# Patient Record
Sex: FEMALE | Race: WHITE | NOT HISPANIC OR LATINO | Employment: FULL TIME | URBAN - METROPOLITAN AREA
[De-identification: names, ages, dates, MRNs, and addresses within clinical notes are randomized per-mention and may not be internally consistent; named-entity substitution may affect disease eponyms.]

---

## 2017-03-29 ENCOUNTER — TELEPHONE (OUTPATIENT)
Dept: FAMILY MEDICINE | Facility: OTHER | Age: 27
End: 2017-03-29

## 2017-03-29 NOTE — TELEPHONE ENCOUNTER
Summary:    Patient is due/failing the following:   LDL, PAP and PHQ9    Action needed:   Patient needs office visit for PAP., Patient needs to do PHQ9. and Patient needs fasting lab only appointment    Type of outreach:    Phone, left message for patient to call back.     Questions for provider review:    None                                                                                                                                    Chana Fink       Chart routed to Care Team .        Panel Management Review      Patient has the following on her problem list:     Depression / Dysthymia review  PHQ-9 SCORE 10/29/2014 4/15/2015 6/22/2015   Total Score 14 12 6      Patient is due for:  PHQ9      Composite cancer screening  Chart review shows that this patient is due/due soon for the following Pap Smear

## 2017-03-29 NOTE — LETTER
[facility]  [address]   [place] [address]  Phone: [phone]  April 6, 2017      Audrey Snell  [address]   [place] [address]      Dear Audrey,    We care about your health and have reviewed your health plan including your medical conditions, medications, and lab results.  Based on this review, it is recommended that you follow up regarding the following health topic(s):  -Cholesterol  -Depression  -Cervical Cancer Screening    We recommend you take the following action(s):  -schedule a FOLLOWUP OFFICE APPOINTMENT.  We will perform the following labs:  Lipids (fasting cholesterol - nothing to eat except water and/or meds for 8-10 hours).  -schedule a PAP SMEAR EXAM which is due.  Please disregard this reminder if you have had this exam elsewhere within the last year.  It would be helpful for us to have a copy of your recent pap smear report to update your records.  -Complete and return the attached PHQ-9 Form.  If your total score is greater than 9, please schedule a followup appointment.  If you answer Yes to question 9, call your clinic between the hours of 8 to 5.  You may also call the Suicide Hotline at 8-436-328-YLTE (2738) any time.     Please call us at the [facility] - [phone] (or use jaeyos) to address the above recommendations.     Thank you for trusting [facility] and we appreciate the opportunity to serve you.  We look forward to supporting your healthcare needs in the future.    Healthy Regards,    Your Health Care Team  [facility]

## 2017-10-29 ENCOUNTER — HEALTH MAINTENANCE LETTER (OUTPATIENT)
Age: 27
End: 2017-10-29

## 2018-03-02 ENCOUNTER — TELEPHONE (OUTPATIENT)
Dept: FAMILY MEDICINE | Facility: OTHER | Age: 28
End: 2018-03-02

## 2018-03-02 ENCOUNTER — OFFICE VISIT (OUTPATIENT)
Dept: FAMILY MEDICINE | Facility: CLINIC | Age: 28
End: 2018-03-02

## 2018-03-02 VITALS
HEART RATE: 76 BPM | HEIGHT: 63 IN | DIASTOLIC BLOOD PRESSURE: 70 MMHG | SYSTOLIC BLOOD PRESSURE: 134 MMHG | BODY MASS INDEX: 24.98 KG/M2 | WEIGHT: 141 LBS | RESPIRATION RATE: 18 BRPM | TEMPERATURE: 98.6 F

## 2018-03-02 DIAGNOSIS — R59.0 AXILLARY LYMPHADENOPATHY: Primary | ICD-10-CM

## 2018-03-02 DIAGNOSIS — F17.200 TOBACCO USE DISORDER: ICD-10-CM

## 2018-03-02 DIAGNOSIS — N64.4 BREAST PAIN, LEFT: ICD-10-CM

## 2018-03-02 LAB
BASOPHILS # BLD AUTO: 0 10E9/L (ref 0–0.2)
BASOPHILS NFR BLD AUTO: 0.2 %
DIFFERENTIAL METHOD BLD: NORMAL
EOSINOPHIL # BLD AUTO: 0 10E9/L (ref 0–0.7)
EOSINOPHIL NFR BLD AUTO: 0.4 %
ERYTHROCYTE [DISTWIDTH] IN BLOOD BY AUTOMATED COUNT: 12.3 % (ref 10–15)
HCT VFR BLD AUTO: 38.1 % (ref 35–47)
HGB BLD-MCNC: 13.2 G/DL (ref 11.7–15.7)
LYMPHOCYTES # BLD AUTO: 1.9 10E9/L (ref 0.8–5.3)
LYMPHOCYTES NFR BLD AUTO: 38.5 %
MCH RBC QN AUTO: 30.8 PG (ref 26.5–33)
MCHC RBC AUTO-ENTMCNC: 34.6 G/DL (ref 31.5–36.5)
MCV RBC AUTO: 89 FL (ref 78–100)
MONOCYTES # BLD AUTO: 0.4 10E9/L (ref 0–1.3)
MONOCYTES NFR BLD AUTO: 9.1 %
NEUTROPHILS # BLD AUTO: 2.5 10E9/L (ref 1.6–8.3)
NEUTROPHILS NFR BLD AUTO: 51.8 %
PLATELET # BLD AUTO: 211 10E9/L (ref 150–450)
RBC # BLD AUTO: 4.28 10E12/L (ref 3.8–5.2)
WBC # BLD AUTO: 4.8 10E9/L (ref 4–11)

## 2018-03-02 PROCEDURE — 36415 COLL VENOUS BLD VENIPUNCTURE: CPT | Performed by: PHYSICIAN ASSISTANT

## 2018-03-02 PROCEDURE — 99214 OFFICE O/P EST MOD 30 MIN: CPT | Performed by: PHYSICIAN ASSISTANT

## 2018-03-02 PROCEDURE — 85025 COMPLETE CBC W/AUTO DIFF WBC: CPT | Performed by: PHYSICIAN ASSISTANT

## 2018-03-02 ASSESSMENT — ANXIETY QUESTIONNAIRES
4. TROUBLE RELAXING: NOT AT ALL
7. FEELING AFRAID AS IF SOMETHING AWFUL MIGHT HAPPEN: NOT AT ALL
GAD7 TOTAL SCORE: 6
3. WORRYING TOO MUCH ABOUT DIFFERENT THINGS: SEVERAL DAYS
5. BEING SO RESTLESS THAT IT IS HARD TO SIT STILL: NOT AT ALL
7. FEELING AFRAID AS IF SOMETHING AWFUL MIGHT HAPPEN: NOT AT ALL
6. BECOMING EASILY ANNOYED OR IRRITABLE: SEVERAL DAYS
2. NOT BEING ABLE TO STOP OR CONTROL WORRYING: NEARLY EVERY DAY
1. FEELING NERVOUS, ANXIOUS, OR ON EDGE: SEVERAL DAYS

## 2018-03-02 ASSESSMENT — PATIENT HEALTH QUESTIONNAIRE - PHQ9
10. IF YOU CHECKED OFF ANY PROBLEMS, HOW DIFFICULT HAVE THESE PROBLEMS MADE IT FOR YOU TO DO YOUR WORK, TAKE CARE OF THINGS AT HOME, OR GET ALONG WITH OTHER PEOPLE: NOT DIFFICULT AT ALL
SUM OF ALL RESPONSES TO PHQ QUESTIONS 1-9: 4
SUM OF ALL RESPONSES TO PHQ QUESTIONS 1-9: 4

## 2018-03-02 ASSESSMENT — PAIN SCALES - GENERAL: PAINLEVEL: EXTREME PAIN (8)

## 2018-03-02 NOTE — MR AVS SNAPSHOT
After Visit Summary   3/2/2018    Audrey Snell    MRN: [id number]           Patient Information     Date Of Birth          1990        Visit Information        Provider Department      3/2/2018 2:40 PM Jovana Hodges PA-C [facility]        Today's Diagnoses     Axillary lymphadenopathy    -  1    Breast pain, left        Tobacco use disorder          Care Instructions    No evidence of infection or obvious abscess.  Your white count is normal and low (goes up with infection)    Plan for imaging at [facility]  OTC pain medications - tylenol, ibuprofen  Can take ibuprofen 800mg every 8 hrs with food  Ice pack/cool packs  If pain intolerable to the ER over the weekend          Follow-ups after your visit        Your next 10 appointments already scheduled     Mar 05, 2018 11:10 AM CST   US BREAST LEFT LIMITED 1-3 QUAD with [id number], MG [facility] ([facility])    [address] [place] [address] [phone]           Please bring a list of your medicines (including vitamins, minerals and over-the-counter drugs). Also, tell your doctor about any allergies you may have. Wear comfortable clothes and leave your valuables at home.  You do not need to do anything special to prepare for your exam.  Please call the Imaging Department at your exam site with any questions.            Mar 05, 2018 11:40 AM CST   (Arrive by 11:25 AM)   MA DIAGNOSTIC BILATERAL W/ WILLIAMS with MGMA2, MG [facility] ([facility])    [address] [place] [address] [phone]           Do not use any powder, lotion or deodorant under your arms or on your breast. If you do, we will ask you to remove it before your exam.  Wear comfortable, two-piece clothing.  If you have any allergies, tell your care team.  Bring any previous mammograms from other facilities or have them mailed to  "the breast center.  Three-dimensional (3D) mammograms are available at [facility] locations in [place], [place], [place], [place], [facility], [place], [place], and [place]. -Health locations include [facility] and [facility] in [place]. Benefits of 3D mammograms include: - Improved rate of cancer detection - Decreases your chance of having to go back for more tests, which means fewer: - \"False-positive\" results (This means that there is an abnormal area but it isn't cancer.) - Invasive testing procedures, such as a biopsy or surgery - Can provide clearer images of the breast if you have dense breast tissue. 3D mammography is an optional exam that anyone can have with a 2D mammogram. It doesn't replace or take the place of a 2D mammogram. 2D mammograms remain an effective screening test for all women.  Not all insurance companies cover the cost of a 3D mammogram. Check with your insurance.              Future tests that were ordered for you today     Open Future Orders        Priority Expected Expires Ordered    US Axillary Left STAT  3/2/2019 3/2/2018    US Breast Left Limited 1-3 Quadrants Routine  3/2/2019 3/2/2018    MA Diagnostic Digital Left Routine  3/2/2019 3/2/2018            Who to contact     If you have questions or need follow up information about today's clinic visit or your schedule please contact [facility] directly at [phone].  Normal or non-critical lab and imaging results will be communicated to you by MyChart, letter or phone within 4 business days after the clinic has received the results. If you do not hear from us within 7 days, please contact the clinic through MyChart or phone. If you have a critical or abnormal lab result, we will notify you by phone as soon as possible.  Submit refill requests through [employer] or call your pharmacy and they will forward the refill request to us. Please allow 3 business days for your refill to be completed.       " "   Additional Information About Your Visit        MyChart Information     [employer] gives you secure access to your electronic health record. If you see a primary care provider, you can also send messages to your care team and make appointments. If you have questions, please call your primary care clinic.  If you do not have a primary care provider, please call [phone] and they will assist you.        Care EveryWhere ID     This is your Care EveryWhere ID. This could be used by other organizations to access your [place] medical records  [id number]        Your Vitals Were     Pulse Temperature Respirations Height Last Period BMI (Body Mass Index)    76 98.6  F (37  C) (Temporal) 18 5' 2.6\" (1.59 m) 02/05/2018 25.3 kg/m2       Blood Pressure from Last 3 Encounters:   03/02/18 134/70   03/03/16 120/84   06/22/15 122/84    Weight from Last 3 Encounters:   03/02/18 141 lb (64 kg)   05/19/16 142 lb (64.4 kg)   03/03/16 144 lb (65.3 kg)              We Performed the Following     CBC with platelets and differential        Primary Care Provider Office Phone # Fax #    Maame Hudson [phone]       [address] [address]        Equal Access to Services     ALE MALDONADO : Hadii alysa ku hadasho Soomaali, waaxda luqadaha, qaybta kaalmada adeegyada, ignacia ocasio . So [facility] [phone].    ATENCIÓN: Si habla español, tiene a lr disposición servicios gratuitos de asistencia lingüística. Llame al [phone].    We comply with applicable federal civil rights laws and [place] laws. We do not discriminate on the basis of race, color, national origin, age, disability, sex, sexual orientation, or gender identity.            Thank you!     Thank you for choosing [facility]  for your care. Our goal is always to provide you with excellent care. Hearing back from our patients is one way we can continue to improve our services. Please take a few minutes to " complete the written survey that you may receive in the mail after your visit with us. Thank you!             Your Updated Medication List - Protect others around you: Learn how to safely use, store and throw away your medicines at www.disposemymeds.org.          This list is accurate as of 3/2/18  3:53 PM.  Always use your most recent med list.                   Brand Name Dispense Instructions for use Diagnosis    MOVE FREE PO      Take 1 tablet by mouth        MULTIVITAMIN PO           SUMAtriptan 25 MG tablet    IMITREX    9 tablet    TAKE 1 TO 2 TABLETS BY MOUTH AT ONSET OF HEADACHE FOR MIGRAINE. MAY REPEAT DOSE IN 2 HOURS. DO NOT EXCEED 200MG IN 24 HOURS    Migraine without status migrainosus, not intractable, unspecified migraine type       ZYRTEC ALLERGY PO

## 2018-03-02 NOTE — PROGRESS NOTES
SUBJECTIVE:   Audrey Snell is a 27 year old female who presents to clinic today for the following health issues:      HPI     Pain in left axilla.     Onset: 1 month ago, patient thought she pulled muscle from shoveling the snow   Sore in back and shoulders. That soreness went away from shoveling. Then a few days after, was applying deodorant felt pain in left axillary area  Found little lump where if pushes on it, it is sore. No drainage or red skin.   Left breast is sore. Has not felt for breast lumps in tissue. No nipple discharge.  Tingling into fingers-  Last night and today. Feels weak with gripping.   Tried heat pack and aggravated it more.   Past few days more uncomfortable.   No fevers, chills. No malaise or flu like sx.   Weight stable.    No other enlarged nodes    Had something similar in 2012- had pain in right axilla.   Had US showing enlarged lymph node.   Node was not going down in size, so had surgery to remove the node.     No fam hx of breast cancer.    Denies neck pain, radiculopathy features.       Description:   Location: left armpit radiated down the fingers   Character: tingling, numbness, dulled ache, burn     Intensity: severe    Progression of Symptoms: worse    Accompanying Signs & Symptoms:  Other symptoms: radiation of pain to fingers, numbness, tingling and warmth    History:   Previous similar pain: YES- lymph node removed in right armpit about 5 years ago. It feels the same sensation.       Precipitating factors:   Trauma or overuse: no, but possible from shoveling the snow     Alleviating factors:  Improved by: nothing    Therapies Tried and outcome: ibuprofen, advil, heating pad      Problem list and histories reviewed & adjusted, as indicated.  Additional history: as documented      Patient Active Problem List   Diagnosis     CARDIOVASCULAR SCREENING; LDL GOAL LESS THAN 160     Anxiety     Moderate major depression (H)     Lumbar radiculopathy     Pain in joint, lower leg      Tobacco use disorder     Migraine     Irritable bowel syndrome with diarrhea     Iliotibial band syndrome, left knee     Past Surgical History:   Procedure Laterality Date     BIOPSY LYMPH NODE AXILLA  3/22/2012    Procedure:BIOPSY LYMPH NODE AXILLA; BIOPSY LYMPH NODE AXILLA ,RIGHT; Surgeon:MELANI DUNAWAY; Location:[facility] OR     none         Social History   Substance Use Topics     Smoking status: Current Every Day Smoker     Packs/day: 1.00     Years: 3.50     Types: Cigarettes     Smokeless tobacco: Never Used      Comment: 1 ppd     Alcohol use No     Family History   Problem Relation Age of Onset     Hypertension Mother      Hypertension Father      CANCER Maternal Grandfather      Asthma Sister      Family History Negative No family hx of      C.A.D. No family hx of      DIABETES No family hx of      CEREBROVASCULAR DISEASE No family hx of      Breast Cancer No family hx of      Prostate Cancer No family hx of      Alcohol/Drug No family hx of      Allergies No family hx of      Alzheimer Disease No family hx of      Anesthesia Reaction No family hx of      Arthritis No family hx of      Blood Disease No family hx of      Cardiovascular No family hx of      Circulatory No family hx of      Congenital Anomalies No family hx of      Connective Tissue Disorder No family hx of      Depression No family hx of      Endocrine Disease No family hx of      Eye Disorder No family hx of      Genetic Disorder No family hx of      GASTROINTESTINAL DISEASE No family hx of      Genitourinary Problems No family hx of      Gynecology No family hx of      HEART DISEASE No family hx of      Lipids No family hx of      Musculoskeletal Disorder No family hx of      Neurologic Disorder No family hx of      Obesity No family hx of      OSTEOPOROSIS No family hx of      Psychotic Disorder No family hx of      Respiratory No family hx of      Thyroid Disease No family hx of      Hearing Loss No family hx of          Current  "Outpatient Prescriptions   Medication Sig Dispense Refill     Cetirizine HCl (ZYRTEC ALLERGY PO)        Glucosamine-Chondroitin (MOVE FREE PO) Take 1 tablet by mouth       SUMAtriptan (IMITREX) 25 MG tablet TAKE 1 TO 2 TABLETS BY MOUTH AT ONSET OF HEADACHE FOR MIGRAINE. MAY REPEAT DOSE IN 2 HOURS. DO NOT EXCEED 200MG IN 24 HOURS (Patient not taking: Reported on 3/2/2018) 9 tablet 0     Multiple Vitamins-Minerals (MULTIVITAMIN PO)        No Known Allergies  BP Readings from Last 3 Encounters:   03/02/18 134/70   03/03/16 120/84   06/22/15 122/84    Wt Readings from Last 3 Encounters:   03/02/18 141 lb (64 kg)   05/19/16 142 lb (64.4 kg)   03/03/16 144 lb (65.3 kg)                  Labs reviewed in EPIC    ROS:  Constitutional, HEENT, cardiovascular, pulmonary, gi and gu systems are negative, except as otherwise noted.    OBJECTIVE:     /70  Pulse 76  Temp 98.6  F (37  C) (Temporal)  Resp 18  Ht 5' 2.6\" (1.59 m)  Wt 141 lb (64 kg)  LMP 02/05/2018  BMI 25.3 kg/m2  Body mass index is 25.3 kg/(m^2).  GENERAL: healthy, alert and no distress  EYES: Eyes grossly normal to inspection, PERRL and conjunctivae and sclerae normal  NECK: no adenopathy, no asymmetry, masses  RESP: lungs clear to auscultation - no rales, rhonchi or wheezes  BREAST: RIGHT: normal without masses, tenderness or nipple discharge, well healed surgical scar in right axilla. LEFT: focal tenderness 3:00 position, no nipple discharge. Noted  + palpable tender enlarged left axillary adenopathy. No focal mass/abscess. No overlying skin changes, redness, or inflammation of hair follicles.    CV: regular rate and rhythm, normal S1 S2, no S3 or S4, no murmur, click or rub  ABDOMEN: soft, nontender, no hepatosplenomegaly, no masses and bowel sounds normal  MS: Cspine: normal ROM without pain, or radiculopathy features.  5/5. Mild hypoesthesia of palmar 5th finger left hand vs right.   PSYCH: mentation appears normal, affect tearful, tearful with " exam of left breast and left axilla  LYMPH: no cervical, supraclavicular, or inguinal adenopathy    Diagnostic Test Results:  Results for orders placed or performed in visit on 03/02/18 (from the past 24 hour(s))   CBC with platelets and differential   Result Value Ref Range    WBC 4.8 4.0 - 11.0 10e9/L    RBC Count 4.28 3.8 - 5.2 10e12/L    Hemoglobin 13.2 11.7 - 15.7 g/dL    Hematocrit 38.1 35.0 - 47.0 %    MCV 89 78 - 100 fl    MCH 30.8 26.5 - 33.0 pg    MCHC 34.6 31.5 - 36.5 g/dL    RDW 12.3 10.0 - 15.0 %    Platelet Count 211 150 - 450 10e9/L    Diff Method Automated Method     % Neutrophils 51.8 %    % Lymphocytes 38.5 %    % Monocytes 9.1 %    % Eosinophils 0.4 %    % Basophils 0.2 %    Absolute Neutrophil 2.5 1.6 - 8.3 10e9/L    Absolute Lymphocytes 1.9 0.8 - 5.3 10e9/L    Absolute Monocytes 0.4 0.0 - 1.3 10e9/L    Absolute Eosinophils 0.0 0.0 - 0.7 10e9/L    Absolute Basophils 0.0 0.0 - 0.2 10e9/L       ASSESSMENT/PLAN:     1. Axillary lymphadenopathy  Normal CBC  Plan for imaging of left axilla and breast  Normal vitals. Afebrile. No evidence on exam of abscess, cellulitis or infection.   No cspine radiculopathy features  - US Axillary Left; Future  - CBC with platelets and differential    2. Breast pain, left  Focal tenderness 3:00 position  - US Breast Left Limited 1-3 Quadrants; Future  - MA Diagnostic Digital Left; Future    3. Tobacco use disorder  Encouraged cessation      Follow Up: imaging scheduled for Monday. No available imaging Friday at 3:15pm.   For worsening symptoms (ie new fevers, worsening pain, redness, sx of abscess, weakness in hand) to ER over weekend.   Discussed parameters for follow up and included in After Visit Summary given to patient.      Jovana Hodges PA-C  [facility]

## 2018-03-02 NOTE — PATIENT INSTRUCTIONS
No evidence of infection or obvious abscess.  Your white count is normal and low (goes up with infection)    Plan for imaging at [facility]  OTC pain medications - tylenol, ibuprofen  Can take ibuprofen 800mg every 8 hrs with food  Ice pack/cool packs  If pain intolerable to the ER over the weekend

## 2018-03-02 NOTE — TELEPHONE ENCOUNTER
"Patient on AE schedule Monday for \"left armpit lump - some numbness like it's hitting a nerve.\"    Please triage if appropriate.  Meseret Sargent, [address]    "

## 2018-03-02 NOTE — TELEPHONE ENCOUNTER
Next 5 appointments (look out 90 days)     Mar 05, 2018  3:15 PM CST   Office Visit with Maame Hudson MD   [facility]    [address]  [place] [address]   [phone]              Audrey Snell is a 27 year old female who calls with lump in the left arm pit.    NURSING ASSESSMENT:  Description:  Lump in left arm pit very uncomfortable. Causing tingling down into the finders. Swelling around the lump. History of inflamed lymph node in the right arm pit that was removed. Taking ibuprofen and aleve as needed. Denies fevers, drainage, discharge, hot to the touch.   Onset/duration:  About 1 month  Precip. factors:  History of swollen lymph nodes in arm pits  Associated symptoms:  Swelling, pain   Improves/worsens symptoms:  Gradually worsening   Pain scale (0-10)   8/10  LMP/preg/breast feeding:  Not addressed   Last exam/Treatment:  03/03/2016  Allergies: No Known Allergies     NURSING PLAN: Nursing advice to patient offered OV in [place] for today due to discomfort, OKAY to keep scheduled appointment    RECOMMENDED DISPOSITION:  See in 24 hours - for pain with lump  Will comply with recommendation: stated would like to keep scheduled appt at this time, if able to get someone to cover her work shift today will call back to see if an opening in [place] is still avilable  If further questions/concerns or if symptoms do not improve, worsen or new symptoms develop, call your PCP or [facility] as soon as possible.    NOTES:  Disposition was determined by the first positive assessment question, therefore all previous assessment questions were negative    Guideline used:  Telephone Triage Protocols for Nurses, Fifth Edition, Audrey Garcia  Skin Lesions: Lumps, Bumps, and Sores  Nursing Judgment    Adelina Mattson, RN, BSN

## 2018-03-03 ASSESSMENT — PATIENT HEALTH QUESTIONNAIRE - PHQ9: SUM OF ALL RESPONSES TO PHQ QUESTIONS 1-9: 4

## 2018-03-03 ASSESSMENT — ANXIETY QUESTIONNAIRES: GAD7 TOTAL SCORE: 6

## 2018-03-05 ENCOUNTER — RADIANT APPOINTMENT (OUTPATIENT)
Dept: MAMMOGRAPHY | Facility: CLINIC | Age: 28
End: 2018-03-05
Attending: PHYSICIAN ASSISTANT

## 2018-03-05 ENCOUNTER — RADIANT APPOINTMENT (OUTPATIENT)
Dept: ULTRASOUND IMAGING | Facility: CLINIC | Age: 28
End: 2018-03-05
Attending: PHYSICIAN ASSISTANT

## 2018-03-05 DIAGNOSIS — N64.4 BREAST PAIN, LEFT: ICD-10-CM

## 2018-03-05 DIAGNOSIS — R59.0 AXILLARY LYMPHADENOPATHY: ICD-10-CM

## 2018-03-05 PROCEDURE — 76882 US LMTD JT/FCL EVL NVASC XTR: CPT | Mod: LT

## 2018-03-05 PROCEDURE — 76642 ULTRASOUND BREAST LIMITED: CPT | Mod: LT

## 2018-03-05 PROCEDURE — 77066 DX MAMMO INCL CAD BI: CPT

## 2018-03-07 ENCOUNTER — RADIANT APPOINTMENT (OUTPATIENT)
Dept: ULTRASOUND IMAGING | Facility: CLINIC | Age: 28
End: 2018-03-07
Attending: PHYSICIAN ASSISTANT

## 2018-03-07 DIAGNOSIS — R59.1 LA (LYMPHADENOPATHY): Primary | ICD-10-CM

## 2018-03-07 DIAGNOSIS — N64.4 BREAST PAIN, LEFT: ICD-10-CM

## 2018-03-07 PROCEDURE — 87116 MYCOBACTERIA CULTURE: CPT | Mod: 90 | Performed by: PHYSICIAN ASSISTANT

## 2018-03-07 PROCEDURE — 38505 NEEDLE BIOPSY LYMPH NODES: CPT | Performed by: RADIOLOGY

## 2018-03-07 PROCEDURE — 88185 FLOWCYTOMETRY/TC ADD-ON: CPT | Mod: 59 | Performed by: PHYSICIAN ASSISTANT

## 2018-03-07 PROCEDURE — 88185 FLOWCYTOMETRY/TC ADD-ON: CPT | Performed by: PHYSICIAN ASSISTANT

## 2018-03-07 PROCEDURE — 99000 SPECIMEN HANDLING OFFICE-LAB: CPT | Performed by: PHYSICIAN ASSISTANT

## 2018-03-07 PROCEDURE — 88184 FLOWCYTOMETRY/ TC 1 MARKER: CPT | Performed by: PHYSICIAN ASSISTANT

## 2018-03-07 PROCEDURE — 88341 IMHCHEM/IMCYTCHM EA ADD ANTB: CPT | Performed by: PHYSICIAN ASSISTANT

## 2018-03-07 PROCEDURE — 76942 ECHO GUIDE FOR BIOPSY: CPT | Performed by: RADIOLOGY

## 2018-03-07 PROCEDURE — 88342 IMHCHEM/IMCYTCHM 1ST ANTB: CPT | Performed by: PHYSICIAN ASSISTANT

## 2018-03-07 PROCEDURE — 88305 TISSUE EXAM BY PATHOLOGIST: CPT | Performed by: PHYSICIAN ASSISTANT

## 2018-03-07 PROCEDURE — 88312 SPECIAL STAINS GROUP 1: CPT | Performed by: PHYSICIAN ASSISTANT

## 2018-03-08 LAB — COPATH REPORT: NORMAL

## 2018-03-12 ENCOUNTER — TELEPHONE (OUTPATIENT)
Dept: GENERAL RADIOLOGY | Facility: CLINIC | Age: 28
End: 2018-03-12

## 2018-03-12 NOTE — TELEPHONE ENCOUNTER
Spoke to Audrey regarding the pending pathology results from her breast biopsy. Writer told Audrey that the results have not been finalized yet and that we will connect with the Pathology department tomorrow to check on the status of the results if necessary. Audrey expressed appreciation for the call and verbalized understanding.

## 2018-03-21 ENCOUNTER — TELEPHONE (OUTPATIENT)
Dept: FAMILY MEDICINE | Facility: OTHER | Age: 28
End: 2018-03-21

## 2018-03-21 DIAGNOSIS — I88.1 KIKUCHI DISEASE: Primary | ICD-10-CM

## 2018-03-21 DIAGNOSIS — R59.1 LYMPHADENOPATHY: ICD-10-CM

## 2018-03-21 NOTE — TELEPHONE ENCOUNTER
Messages exchanged with  in [place]. She advised consult and likely excisional bx if sx persist. Spoke with Audrey who would like to proceed with surgical consult and is ok with going to [place] to see . Please assist pt with scheduling with  for office visit/consult. Please call Audrey at [phone] ([employer]). Jovana Hodges PA-C

## 2018-03-28 ENCOUNTER — OFFICE VISIT (OUTPATIENT)
Dept: SURGERY | Facility: OTHER | Age: 28
End: 2018-03-28
Attending: PHYSICIAN ASSISTANT

## 2018-03-28 VITALS
SYSTOLIC BLOOD PRESSURE: 140 MMHG | OXYGEN SATURATION: 100 % | BODY MASS INDEX: 26.02 KG/M2 | TEMPERATURE: 98 F | WEIGHT: 145 LBS | DIASTOLIC BLOOD PRESSURE: 80 MMHG | HEART RATE: 92 BPM

## 2018-03-28 DIAGNOSIS — R59.0 LYMPHADENOPATHY, AXILLARY: Primary | ICD-10-CM

## 2018-03-28 PROCEDURE — 99243 OFF/OP CNSLTJ NEW/EST LOW 30: CPT | Performed by: SURGERY

## 2018-03-28 ASSESSMENT — PAIN SCALES - GENERAL: PAINLEVEL: SEVERE PAIN (6)

## 2018-03-28 NOTE — PROGRESS NOTES
General Surgery Consultation    Audrey Snell MRN# [id number]   Age: 27 year old YOB: 1990     Reason for consult: lymphadenitis                        Assessment and Plan:   I was asked to see this patient at the request of AGUILA Armando for evaluation of lymphadenitis.  Audrey Snell is a 27 year old female who presented with history, exam, laboratory and imaging most consistent with:        ICD-10-CM    1. Lymphadenopathy, axillary, left R59.0        Patient has lymphadenopathy on the left axilla.  Stated the symptoms is improved and the swelling in her axilla is getting better.  She previously had this core biopsy and final diagnosis as necrotizing lymphadenopathy favoring Kikuchi lymphadenitis; however per final pathology report due to the tiny core biopsy sample, the study is limited.  Thus I recommend that she sees me to her primary care provider to possibly do an excisional biopsy of the lymph node.  Since everything is slowly improving, I recommend that patient continue without an excisional biopsy for 2-4 weeks, if the lymphadenopathy and the pain does not continue to improve in this time or patient becomes more anxious due to the diagnosis we can always schedule an excisional biopsy of the lymph node.  I explained to the patient that this procedure is not a curative procedure, it is simply to get more tissue to confirm the diagnosis.  I explained to the patient that the procedure would require a wire localization into this lymph node which has a clip, and I would take out that one particular lymph node.  Explained to the patient the risk of infections, bleeding, and more surgery, patient symptoms might not improve after surgery or it can get worse due to surgery.  Patient shows understanding and agrees with the plan above.  She will call me in 2-4 weeks if she wants to get the procedure done.      I thank AGUILA Armando for the opportunity to participate in the patient's care.            Chief Complaint:     Left axillary lymphadenopathy     History is obtained from the patient         History of Present Illness:   This patient is a 27 year old  female with a significant past medical history of Kikuchi lymphadenitis of the right axilla who presents with 2+ mos history of left axillary pain and lymphadenopathy.  Patient stated that she was shoveling 2+ months ago and started feeling pain in her left axilla, thought she pulled a muscle however the pain got worse and also her axilla increased in size.  She just cannot let this go but finally went and saw her primary care provider as the pain did not improve and she had associated symptoms like daily night sweats swelling in her axilla, nausea, and vomiting.  Patient also started noticing left breast pain approximately 1 month ago.  Stated that this breast pain is slowly getting worse.  Patient stated her other symptoms of this axillary pain and breast pain is pain that radiates into her arm all the way down to all of her fingers she has numbness and tingling.  Patient denies any fevers however she does have chills.  She denies any weight loss.  Patient has history of similar presentation on the right axilla in 2012.  She had an excisional biopsy of her right axilla lymph node pathology came back as Kikuchi lymphadenitis.  She stated at that time her pain and all of her associated symptoms of the right axilla improved after she had the excisional biopsy.  Patient does have family history of lymphoma in her maternal grandfather.  Patient stated overall her pain in the left axilla has improved.  Swelling has also improved.  However patient stated the pain increase in the swelling increase especially at night after she had a full day of work.          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          Past  Surgical History:     Past Surgical History:   Procedure Laterality Date     BIOPSY LYMPH NODE AXILLA  3/22/2012    Procedure:BIOPSY LYMPH NODE AXILLA; BIOPSY LYMPH NODE AXILLA ,RIGHT; Surgeon:MELANI DUNAWAY; Location: OR     none             Medications:     Current Outpatient Prescriptions on File Prior to Visit:  Cetirizine HCl (ZYRTEC ALLERGY PO)    Multiple Vitamins-Minerals (MULTIVITAMIN PO)    Glucosamine-Chondroitin (MOVE FREE PO) Take 1 tablet by mouth   SUMAtriptan (IMITREX) 25 MG tablet TAKE 1 TO 2 TABLETS BY MOUTH AT ONSET OF HEADACHE FOR MIGRAINE. MAY REPEAT DOSE IN 2 HOURS. DO NOT EXCEED 200MG IN 24 HOURS (Patient not taking: Reported on 3/2/2018)     No current facility-administered medications on file prior to visit.       Allergies:    No Known Allergies         Social History:   Audrey Snell  reports that she has been smoking Cigarettes.  She has a 3.50 pack-year smoking history. She has never used smokeless tobacco. She reports that she uses illicit drugs. She reports that she does not drink alcohol.          Family History:   The patient has no family history of any bleeding, clotting or anesthesia problems.          Review of Systems:     Skin: negative, rash, scaling, itching, bruising  Eyes: negative for, glaucoma, eye pain, color blindness  Ears/Nose/Throat: negative, deafness, tinnitus  Respiratory: No shortness of breath, dyspnea on exertion, cough, or hemoptysis  Cardiovascular: negative, palpitations, tachycardia and irregular heart beat  Gastrointestinal: positive for nausea, vomiting and reflux  Genitourinary: negative, dysuria, frequency and urgency  Musculoskeletal: positive for left axilla pain,  Left arm and fingers numbness/tingling  Neurologic: positive for numbness or tingling of hands  Psychiatric: positive for anxiety  Hematologic/Lymphatic/Immunologic: positive for swollen nodes  Endocrine: negative         Physical Exam:     Constitutional: Awake, alert, no acute  distress.  Eyes:  No scleral icterus.  Conjunctiva are without injection.  ENMT: Mucous membranes moist, dentition and gums are intact.   Neck: Soft, supple, trachea midline.    Endocrine: The thyroid is without masses and mobile with swallow.   Lymphatic: There is no cervical, submandibular, supraclavicular/infraclavicular, axillary, or inguinal adenopathy.  Respiratory: Lungs are clear to auscultation and percussion bilaterally.   Cardiovascular: Regular rate and rhythm. No murmurs, rubs, or gallops.    Abdomen: Non-distended, non-tender, normoactive bowel sounds present, No masses, , or flank tenderness. No hepatosplenomegaly.   Breasts: bilateral normal without suspicious masses, skin changes or axillary nodes, symmetric fibrous changes in both upper outer quadrants, self exam is taught and encouraged.  Bilateral fibrocystic dense glandular breasts.    Axillae: palpable several lymph nodes on left axilla, these were nonmatted but tender to palpation.  Right axilla with no lymphadenopathy   Musculoskeletal: No spinal or CVA tenderness. Full range of motion in the upper and lower extremities.    Skin: No skin rashes or lesions to inspection.  No petechia.    Neurologic: Cranial nerves II through XII are grossly intact and symmetric.  Psychiatric: The patient is alert and oriented times 3.  The patient's affect is not blunted and mood is appropriate.          Data:   WBC -   WBC   Date Value Ref Range Status   03/02/2018 4.8 4.0 - 11.0 10e9/L Final   ], HgB -   Hemoglobin   Date Value Ref Range Status   03/02/2018 13.2 11.7 - 15.7 g/dL Final   ]   Liver Function Studies -   Recent Labs   Lab Test  10/29/14   1359   PROTTOTAL  7.5   ALBUMIN  4.4   BILITOTAL  0.4   ALKPHOS  53   AST  12   ALT  25     Recent Results (from the past 744 hour(s))   MA Diagnostic Digital Bilateral    Narrative    Exam: Bilateral digital diagnostic mammography with CAD, left breast  and left axillary ultrasound. - 3/5/2018 11:57  AM.    History: Screening callback. Left axillary pain, and left breast pain  at 3:00 position Similar symptoms to previous right axillary pain.  Previous excisional biopsy of right axillary lymph node with pathology  of necrotizing lymphadenitis on 3/22/2012.    Comparisons: Right axillary ultrasound dating 2/29/2012    FINDINGS:        BREAST DENSITY: Heterogeneously dense.    Mammography demonstrates no suspicious findings in either breast.     Targeted left breast ultrasound performed by the technologist and  radiologist in the region of focal pain at 3:00 position, 4 cm from  the nipple demonstrating normal breast tissue. Several small  benign-appearing cysts are seen along the 3:00 axis during real-time  imaging.     Evaluation of the left axilla, in the region of focal tenderness,  demonstrates a round circumscribed mass measuring 1.3 x 1.2 x 1.2 cm  with heterogeneous echogenicity and no internal vascularity, likely  represent abnormal and possibly necrotic lymph node. Multiple  additional enlarged left axillary lymph nodes with thickened cortex  and effaced fatty hilum are seen in the left axilla including at level  1 and level 2.     Right axilla evaluated for comparison demonstrating several  benign-appearing lymph nodes during real-time imaging.       Impression    IMPRESSION: BI-RADS CATEGORY: 4 - Suspicious Abnormality-Biopsy Should  Be Considered.    RECOMMENDED FOLLOW-UP: Biopsy.  Ultrasound guided core needle left axillary biopsy.    Clinical follow-up of left breast symptomatic areas. Given lack of  suspicious imaging findings, management would need to be based on  clinical grounds.      The patient was given the results of the examination.    I have personally reviewed the examination and initial interpretation  and I agree with the findings.    NIKA DUFFY MD   US Breast Left Limited 1-3 Quadrants    Narrative    Exam: Bilateral digital diagnostic mammography with CAD, left breast  and  left axillary ultrasound. - 3/5/2018 11:57 AM.    History: Screening callback. Left axillary pain, and left breast pain  at 3:00 position Similar symptoms to previous right axillary pain.  Previous excisional biopsy of right axillary lymph node with pathology  of necrotizing lymphadenitis on 3/22/2012.    Comparisons: Right axillary ultrasound dating 2/29/2012    FINDINGS:        BREAST DENSITY: Heterogeneously dense.    Mammography demonstrates no suspicious findings in either breast.     Targeted left breast ultrasound performed by the technologist and  radiologist in the region of focal pain at 3:00 position, 4 cm from  the nipple demonstrating normal breast tissue. Several small  benign-appearing cysts are seen along the 3:00 axis during real-time  imaging.     Evaluation of the left axilla, in the region of focal tenderness,  demonstrates a round circumscribed mass measuring 1.3 x 1.2 x 1.2 cm  with heterogeneous echogenicity and no internal vascularity, likely  represent abnormal and possibly necrotic lymph node. Multiple  additional enlarged left axillary lymph nodes with thickened cortex  and effaced fatty hilum are seen in the left axilla including at level  1 and level 2.     Right axilla evaluated for comparison demonstrating several  benign-appearing lymph nodes during real-time imaging.       Impression    IMPRESSION: BI-RADS CATEGORY: 4 - Suspicious Abnormality-Biopsy Should  Be Considered.    RECOMMENDED FOLLOW-UP: Biopsy.  Ultrasound guided core needle left axillary biopsy.    Clinical follow-up of left breast symptomatic areas. Given lack of  suspicious imaging findings, management would need to be based on  clinical grounds.      The patient was given the results of the examination.    I have personally reviewed the examination and initial interpretation  and I agree with the findings.    NIKA DUFFY MD   US Axillary Left    Narrative    Exam: Bilateral digital diagnostic mammography with  CAD, left breast  and left axillary ultrasound. - 3/5/2018 11:57 AM.    History: Screening callback. Left axillary pain, and left breast pain  at 3:00 position Similar symptoms to previous right axillary pain.  Previous excisional biopsy of right axillary lymph node with pathology  of necrotizing lymphadenitis on 3/22/2012.    Comparisons: Right axillary ultrasound dating 2/29/2012    FINDINGS:        BREAST DENSITY: Heterogeneously dense.    Mammography demonstrates no suspicious findings in either breast.     Targeted left breast ultrasound performed by the technologist and  radiologist in the region of focal pain at 3:00 position, 4 cm from  the nipple demonstrating normal breast tissue. Several small  benign-appearing cysts are seen along the 3:00 axis during real-time  imaging.     Evaluation of the left axilla, in the region of focal tenderness,  demonstrates a round circumscribed mass measuring 1.3 x 1.2 x 1.2 cm  with heterogeneous echogenicity and no internal vascularity, likely  represent abnormal and possibly necrotic lymph node. Multiple  additional enlarged left axillary lymph nodes with thickened cortex  and effaced fatty hilum are seen in the left axilla including at level  1 and level 2.     Right axilla evaluated for comparison demonstrating several  benign-appearing lymph nodes during real-time imaging.       Impression    IMPRESSION: BI-RADS CATEGORY: 4 - Suspicious Abnormality-Biopsy Should  Be Considered.    RECOMMENDED FOLLOW-UP: Biopsy.  Ultrasound guided core needle left axillary biopsy.    Clinical follow-up of left breast symptomatic areas. Given lack of  suspicious imaging findings, management would need to be based on  clinical grounds.      The patient was given the results of the examination.    I have personally reviewed the examination and initial interpretation  and I agree with the findings.    NIKA DUFFY MD   US Biopsy Lymph Node Core    Addendum: 3/16/2018     "Addendum:    Lymph node, left axillary, ultrasound-guided fine needle aspiration:   - Necrotizing lymphadenopathy, favor Kikuchi lymphadenitis.  Similar  morphological features when compared to 2012 right excisional axillary  lymph node biopsy; with consensus at hematopathology faculty  conference.  - No immunophenotypic evidence of B cell non-Hodgkin lymphoma in this  limited study.     Concordant with imaging.    Recommendation:  Clinical follow-up left axillary lymph nodes and left  breast pain.  Further management would need to be based on clinical  grounds.    ROBERT WHITFIELD MD      Narrative    EXAMINATION: US BIOPSY CORE LYMPH NODE, 3/7/2018 2:28 PM     HISTORY: 27 years old with left axillary and left breast pain.  Previous excisional biopsy of right axillary lymph node of necrotizing  lymphadenitis in 2012.    COMPARISON: Mammogram and ultrasound 3/5/2018.    CONSENT: The procedure, benefits and risks, were explained and  discussed with the patient. Risks included: infection, bleeding, and  the small possibility of even life threatening complications. Written  and verbal consent were obtained.    PROCEDURE: Using aseptic technique, up to 10 cc of 1% lidocaine  buffered with sodium bicarbonate for local anesthesia, ultrasound  guidance, and a biopsy needle were utilized to sample lesion. A  radiopaque biopsy marker was placed. Pressure was held over this area  for approximately 10 to 15 minutes until there was adequate  hemostasis. A dressing was placed and post biopsy care instructions  were discussed with the patient. There were no apparent complications.    Preprocedure imaging demonstrates numerous enlarged lymph nodes. There  is diffuse increased vascularity.    Location: Left axilla  Needle: 14 gauge core needle biopsy system  No. of passes: 3  Clip shape: BiomarC (shaped liked a \"barbell\") clip     A specimen was placed into formalin, RPMI, and saline to test for  histology, cytometry, and atypical " infection, respectively.      Impression    IMPRESSION: Uncomplicated ultrasound-guided core needle biopsy of the  left axilla.    CHAD DELANEY MD        [facility], DO 3/28/2018 8:38 AM

## 2018-03-28 NOTE — LETTER
3/28/2018         RE: Audrey Snell  [address]  [facility] [address]        Dear Colleague,    Thank you for referring your patient, Audrey Snell, to the [facility]. Please see a copy of my visit note below.    General Surgery Consultation    Audrey Snell MRN# [id number]   Age: 27 year old YOB: 1990     Reason for consult: lymphadenitis                        Assessment and Plan:   I was asked to see this patient at the request of AGUILA Armando for evaluation of lymphadenitis.  Audrey Snell is a 27 year old female who presented with history, exam, laboratory and imaging most consistent with:        ICD-10-CM    1. Lymphadenopathy, axillary, left R59.0        Patient has lymphadenopathy on the left axilla.  Stated the symptoms is improved and the swelling in her axilla is getting better.  She previously had this core biopsy and final diagnosis as necrotizing lymphadenopathy favoring Kikuchi lymphadenitis; however per final pathology report due to the tiny core biopsy sample, the study is limited.  Thus I recommend that she sees me to her primary care provider to possibly do an excisional biopsy of the lymph node.  Since everything is slowly improving, I recommend that patient continue without an excisional biopsy for 2-4 weeks, if the lymphadenopathy and the pain does not continue to improve in this time or patient becomes more anxious due to the diagnosis we can always schedule an excisional biopsy of the lymph node.  I explained to the patient that this procedure is not a curative procedure, it is simply to get more tissue to confirm the diagnosis.  I explained to the patient that the procedure would require a wire localization into this lymph node which has a clip, and I would take out that one particular lymph node.  Explained to the patient the risk of infections, bleeding, and more surgery, patient symptoms might not improve after surgery or it  can get worse due to surgery.  Patient shows understanding and agrees with the plan above.  She will call me in 2-4 weeks if she wants to get the procedure done.      I thank AGUILA Armando for the opportunity to participate in the patient's care.           Chief Complaint:     Left axillary lymphadenopathy     History is obtained from the patient         History of Present Illness:   This patient is a 27 year old  female with a significant past medical history of Kikuchi lymphadenitis of the right axilla who presents with 2+ mos history of left axillary pain and lymphadenopathy.  Patient stated that she was shoveling 2+ months ago and started feeling pain in her left axilla, thought she pulled a muscle however the pain got worse and also her axilla increased in size.  She just cannot let this go but finally went and saw her primary care provider as the pain did not improve and she had associated symptoms like daily night sweats swelling in her axilla, nausea, and vomiting.  Patient also started noticing left breast pain approximately 1 month ago.  Stated that this breast pain is slowly getting worse.  Patient stated her other symptoms of this axillary pain and breast pain is pain that radiates into her arm all the way down to all of her fingers she has numbness and tingling.  Patient denies any fevers however she does have chills.  She denies any weight loss.  Patient has history of similar presentation on the right axilla in 2012.  She had an excisional biopsy of her right axilla lymph node pathology came back as Kikuchi lymphadenitis.  She stated at that time her pain and all of her associated symptoms of the right axilla improved after she had the excisional biopsy.  Patient does have family history of lymphoma in her maternal grandfather.  Patient stated overall her pain in the left axilla has improved.  Swelling has also improved.  However patient stated the pain increase in the swelling increase  especially at night after she had a full day of work.          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          Past Surgical History:     Past Surgical History:   Procedure Laterality Date     BIOPSY LYMPH NODE AXILLA  3/22/2012    Procedure:BIOPSY LYMPH NODE AXILLA; BIOPSY LYMPH NODE AXILLA ,RIGHT; Surgeon:MELANI DUNAWAY; Location: OR     none             Medications:     Current Outpatient Prescriptions on File Prior to Visit:  Cetirizine HCl (ZYRTEC ALLERGY PO)    Multiple Vitamins-Minerals (MULTIVITAMIN PO)    Glucosamine-Chondroitin (MOVE FREE PO) Take 1 tablet by mouth   SUMAtriptan (IMITREX) 25 MG tablet TAKE 1 TO 2 TABLETS BY MOUTH AT ONSET OF HEADACHE FOR MIGRAINE. MAY REPEAT DOSE IN 2 HOURS. DO NOT EXCEED 200MG IN 24 HOURS (Patient not taking: Reported on 3/2/2018)     No current facility-administered medications on file prior to visit.       Allergies:    No Known Allergies         Social History:   Audrey Snell  reports that she has been smoking Cigarettes.  She has a 3.50 pack-year smoking history. She has never used smokeless tobacco. She reports that she uses illicit drugs. She reports that she does not drink alcohol.          Family History:   The patient has no family history of any bleeding, clotting or anesthesia problems.          Review of Systems:     Skin: negative, rash, scaling, itching, bruising  Eyes: negative for, glaucoma, eye pain, color blindness  Ears/Nose/Throat: negative, deafness, tinnitus  Respiratory: No shortness of breath, dyspnea on exertion, cough, or hemoptysis  Cardiovascular: negative, palpitations, tachycardia and irregular heart beat  Gastrointestinal: positive for nausea, vomiting and reflux  Genitourinary: negative, dysuria, frequency and urgency  Musculoskeletal: positive for left axilla pain,  Left arm and fingers  numbness/tingling  Neurologic: positive for numbness or tingling of hands  Psychiatric: positive for anxiety  Hematologic/Lymphatic/Immunologic: positive for swollen nodes  Endocrine: negative         Physical Exam:     Constitutional: Awake, alert, no acute distress.  Eyes:  No scleral icterus.  Conjunctiva are without injection.  ENMT: Mucous membranes moist, dentition and gums are intact.   Neck: Soft, supple, trachea midline.    Endocrine: The thyroid is without masses and mobile with swallow.   Lymphatic: There is no cervical, submandibular, supraclavicular/infraclavicular, axillary, or inguinal adenopathy.  Respiratory: Lungs are clear to auscultation and percussion bilaterally.   Cardiovascular: Regular rate and rhythm. No murmurs, rubs, or gallops.    Abdomen: Non-distended, non-tender, normoactive bowel sounds present, No masses, , or flank tenderness. No hepatosplenomegaly.   Breasts: bilateral normal without suspicious masses, skin changes or axillary nodes, symmetric fibrous changes in both upper outer quadrants, self exam is taught and encouraged.  Bilateral fibrocystic dense glandular breasts.    Axillae: palpable several lymph nodes on left axilla, these were nonmatted but tender to palpation.  Right axilla with no lymphadenopathy   Musculoskeletal: No spinal or CVA tenderness. Full range of motion in the upper and lower extremities.    Skin: No skin rashes or lesions to inspection.  No petechia.    Neurologic: Cranial nerves II through XII are grossly intact and symmetric.  Psychiatric: The patient is alert and oriented times 3.  The patient's affect is not blunted and mood is appropriate.          Data:   WBC -   WBC   Date Value Ref Range Status   03/02/2018 4.8 4.0 - 11.0 10e9/L Final   ], HgB -   Hemoglobin   Date Value Ref Range Status   03/02/2018 13.2 11.7 - 15.7 g/dL Final   ]   Liver Function Studies -   Recent Labs   Lab Test  10/29/14   1359   PROTTOTAL  7.5   ALBUMIN  4.4   BILITOTAL  0.4    ALKPHOS  53   AST  12   ALT  25     Recent Results (from the past 744 hour(s))   MA Diagnostic Digital Bilateral    Narrative    Exam: Bilateral digital diagnostic mammography with CAD, left breast  and left axillary ultrasound. - 3/5/2018 11:57 AM.    History: Screening callback. Left axillary pain, and left breast pain  at 3:00 position Similar symptoms to previous right axillary pain.  Previous excisional biopsy of right axillary lymph node with pathology  of necrotizing lymphadenitis on 3/22/2012.    Comparisons: Right axillary ultrasound dating 2/29/2012    FINDINGS:        BREAST DENSITY: Heterogeneously dense.    Mammography demonstrates no suspicious findings in either breast.     Targeted left breast ultrasound performed by the technologist and  radiologist in the region of focal pain at 3:00 position, 4 cm from  the nipple demonstrating normal breast tissue. Several small  benign-appearing cysts are seen along the 3:00 axis during real-time  imaging.     Evaluation of the left axilla, in the region of focal tenderness,  demonstrates a round circumscribed mass measuring 1.3 x 1.2 x 1.2 cm  with heterogeneous echogenicity and no internal vascularity, likely  represent abnormal and possibly necrotic lymph node. Multiple  additional enlarged left axillary lymph nodes with thickened cortex  and effaced fatty hilum are seen in the left axilla including at level  1 and level 2.     Right axilla evaluated for comparison demonstrating several  benign-appearing lymph nodes during real-time imaging.       Impression    IMPRESSION: BI-RADS CATEGORY: 4 - Suspicious Abnormality-Biopsy Should  Be Considered.    RECOMMENDED FOLLOW-UP: Biopsy.  Ultrasound guided core needle left axillary biopsy.    Clinical follow-up of left breast symptomatic areas. Given lack of  suspicious imaging findings, management would need to be based on  clinical grounds.      The patient was given the results of the examination.    I have  personally reviewed the examination and initial interpretation  and I agree with the findings.    NIKA DUFFY MD   US Breast Left Limited 1-3 Quadrants    Narrative    Exam: Bilateral digital diagnostic mammography with CAD, left breast  and left axillary ultrasound. - 3/5/2018 11:57 AM.    History: Screening callback. Left axillary pain, and left breast pain  at 3:00 position Similar symptoms to previous right axillary pain.  Previous excisional biopsy of right axillary lymph node with pathology  of necrotizing lymphadenitis on 3/22/2012.    Comparisons: Right axillary ultrasound dating 2/29/2012    FINDINGS:        BREAST DENSITY: Heterogeneously dense.    Mammography demonstrates no suspicious findings in either breast.     Targeted left breast ultrasound performed by the technologist and  radiologist in the region of focal pain at 3:00 position, 4 cm from  the nipple demonstrating normal breast tissue. Several small  benign-appearing cysts are seen along the 3:00 axis during real-time  imaging.     Evaluation of the left axilla, in the region of focal tenderness,  demonstrates a round circumscribed mass measuring 1.3 x 1.2 x 1.2 cm  with heterogeneous echogenicity and no internal vascularity, likely  represent abnormal and possibly necrotic lymph node. Multiple  additional enlarged left axillary lymph nodes with thickened cortex  and effaced fatty hilum are seen in the left axilla including at level  1 and level 2.     Right axilla evaluated for comparison demonstrating several  benign-appearing lymph nodes during real-time imaging.       Impression    IMPRESSION: BI-RADS CATEGORY: 4 - Suspicious Abnormality-Biopsy Should  Be Considered.    RECOMMENDED FOLLOW-UP: Biopsy.  Ultrasound guided core needle left axillary biopsy.    Clinical follow-up of left breast symptomatic areas. Given lack of  suspicious imaging findings, management would need to be based on  clinical grounds.      The patient was given  the results of the examination.    I have personally reviewed the examination and initial interpretation  and I agree with the findings.    NIKA DUFFY MD   US Axillary Left    Narrative    Exam: Bilateral digital diagnostic mammography with CAD, left breast  and left axillary ultrasound. - 3/5/2018 11:57 AM.    History: Screening callback. Left axillary pain, and left breast pain  at 3:00 position Similar symptoms to previous right axillary pain.  Previous excisional biopsy of right axillary lymph node with pathology  of necrotizing lymphadenitis on 3/22/2012.    Comparisons: Right axillary ultrasound dating 2/29/2012    FINDINGS:        BREAST DENSITY: Heterogeneously dense.    Mammography demonstrates no suspicious findings in either breast.     Targeted left breast ultrasound performed by the technologist and  radiologist in the region of focal pain at 3:00 position, 4 cm from  the nipple demonstrating normal breast tissue. Several small  benign-appearing cysts are seen along the 3:00 axis during real-time  imaging.     Evaluation of the left axilla, in the region of focal tenderness,  demonstrates a round circumscribed mass measuring 1.3 x 1.2 x 1.2 cm  with heterogeneous echogenicity and no internal vascularity, likely  represent abnormal and possibly necrotic lymph node. Multiple  additional enlarged left axillary lymph nodes with thickened cortex  and effaced fatty hilum are seen in the left axilla including at level  1 and level 2.     Right axilla evaluated for comparison demonstrating several  benign-appearing lymph nodes during real-time imaging.       Impression    IMPRESSION: BI-RADS CATEGORY: 4 - Suspicious Abnormality-Biopsy Should  Be Considered.    RECOMMENDED FOLLOW-UP: Biopsy.  Ultrasound guided core needle left axillary biopsy.    Clinical follow-up of left breast symptomatic areas. Given lack of  suspicious imaging findings, management would need to be based on  clinical  grounds.      The patient was given the results of the examination.    I have personally reviewed the examination and initial interpretation  and I agree with the findings.    NIKA DUFFY MD   US Biopsy Lymph Node Core    Addendum: 3/16/2018    Addendum:    Lymph node, left axillary, ultrasound-guided fine needle aspiration:   - Necrotizing lymphadenopathy, favor Kikuchi lymphadenitis.  Similar  morphological features when compared to 2012 right excisional axillary  lymph node biopsy; with consensus at hematopathology faculty  conference.  - No immunophenotypic evidence of B cell non-Hodgkin lymphoma in this  limited study.     Concordant with imaging.    Recommendation:  Clinical follow-up left axillary lymph nodes and left  breast pain.  Further management would need to be based on clinical  grounds.    ROBERT WHITFIELD MD      Narrative    EXAMINATION: US BIOPSY CORE LYMPH NODE, 3/7/2018 2:28 PM     HISTORY: 27 years old with left axillary and left breast pain.  Previous excisional biopsy of right axillary lymph node of necrotizing  lymphadenitis in 2012.    COMPARISON: Mammogram and ultrasound 3/5/2018.    CONSENT: The procedure, benefits and risks, were explained and  discussed with the patient. Risks included: infection, bleeding, and  the small possibility of even life threatening complications. Written  and verbal consent were obtained.    PROCEDURE: Using aseptic technique, up to 10 cc of 1% lidocaine  buffered with sodium bicarbonate for local anesthesia, ultrasound  guidance, and a biopsy needle were utilized to sample lesion. A  radiopaque biopsy marker was placed. Pressure was held over this area  for approximately 10 to 15 minutes until there was adequate  hemostasis. A dressing was placed and post biopsy care instructions  were discussed with the patient. There were no apparent complications.    Preprocedure imaging demonstrates numerous enlarged lymph nodes. There  is diffuse increased  "vascularity.    Location: Left axilla  Needle: 14 gauge core needle biopsy system  No. of passes: 3  Clip shape: BiomarC (shaped liked a \"barbell\") clip     A specimen was placed into formalin, RPMI, and saline to test for  histology, cytometry, and atypical infection, respectively.      Impression    IMPRESSION: Uncomplicated ultrasound-guided core needle biopsy of the  left axilla.    CHAD DELANEY MD        [facility] [place],  3/28/2018 8:38 AM           Again, thank you for allowing me to participate in the care of your patient.        Sincerely,        WilfridJoshua Delgado MD    "

## 2018-03-28 NOTE — MR AVS SNAPSHOT
After Visit Summary   3/28/2018    Audrey Snell    MRN: [id number]           Patient Information     Date Of Birth          1990        Visit Information        Provider Department      3/28/2018 8:00 AM Tunde Delgado MD [facility]         Follow-ups after your visit        Who to contact     If you have questions or need follow up information about today's clinic visit or your schedule please contact [facility] directly at [phone].  Normal or non-critical lab and imaging results will be communicated to you by MyChart, letter or phone within 4 business days after the clinic has received the results. If you do not hear from us within 7 days, please contact the clinic through [employer] or phone. If you have a critical or abnormal lab result, we will notify you by phone as soon as possible.  Submit refill requests through [employer] or call your pharmacy and they will forward the refill request to us. Please allow 3 business days for your refill to be completed.          Additional Information About Your Visit        MyChart Information     [employer] gives you secure access to your electronic health record. If you see a primary care provider, you can also send messages to your care team and make appointments. If you have questions, please call your primary care clinic.  If you do not have a primary care provider, please call [phone] and they will assist you.        Care EveryWhere ID     This is your Care EveryWhere ID. This could be used by other organizations to access your [facility] medical records  [id number]        Your Vitals Were     Pulse Temperature Pulse Oximetry BMI (Body Mass Index)          92 98  F (36.7  C) (Temporal) 100% 26.02 kg/m2         Blood Pressure from Last 3 Encounters:   03/28/18 140/80   03/02/18 134/70   03/03/16 120/84    Weight from Last 3 Encounters:   03/28/18 145 lb (65.8 kg)   03/02/18 141 lb (64 kg)   05/19/16 142 lb (64.4 kg)               Today, you had the following     No orders found for display       Primary Care Provider Office Phone # Fax #    Maame Hudson [phone]       [address] [place] [address]        Equal Access to Services     CHARLIE MALDONADO : Hadii alysa reaves parveeno Sofranckali, waaxda luqadaha, qaybta kaalmada adedidieryada, ignacia kearns rosaliedidier singh ninfa rosales. So [facility] [phone].    ATENCIÓN: Si habla español, tiene a lr disposición servicios gratuitos de asistencia lingüística. Llame al [phone].    We comply with applicable federal civil rights laws and [place] laws. We do not discriminate on the basis of race, color, national origin, age, disability, sex, sexual orientation, or gender identity.            Thank you!     Thank you for choosing [facility]  for your care. Our goal is always to provide you with excellent care. Hearing back from our patients is one way we can continue to improve our services. Please take a few minutes to complete the written survey that you may receive in the mail after your visit with us. Thank you!             Your Updated Medication List - Protect others around you: Learn how to safely use, store and throw away your medicines at www.disposemymeds.org.          This list is accurate as of 3/28/18  8:37 AM.  Always use your most recent med list.                   Brand Name Dispense Instructions for use Diagnosis    MOVE FREE PO      Take 1 tablet by mouth        MULTIVITAMIN PO           SUMAtriptan 25 MG tablet    IMITREX    9 tablet    TAKE 1 TO 2 TABLETS BY MOUTH AT ONSET OF HEADACHE FOR MIGRAINE. MAY REPEAT DOSE IN 2 HOURS. DO NOT EXCEED 200MG IN 24 HOURS    Migraine without status migrainosus, not intractable, unspecified migraine type       ZYRTEC ALLERGY PO

## 2018-04-02 LAB — COPATH REPORT: NORMAL

## 2018-04-05 ENCOUNTER — TELEPHONE (OUTPATIENT)
Dept: FAMILY MEDICINE | Facility: CLINIC | Age: 28
End: 2018-04-05

## 2018-04-05 DIAGNOSIS — I88.1 HISTIOCYTIC NECROTIZING LYMPHADENITIS: ICD-10-CM

## 2018-04-05 DIAGNOSIS — I88.1 KIKUCHI DISEASE: Primary | ICD-10-CM

## 2018-04-05 NOTE — TELEPHONE ENCOUNTER
Addendum with additional comment added to path report:  COMMENTS:   One additional comment from the faculty includes that since this patient   has recurrent necrotizing   lymphadenitis, evaluation for autoimmune disease, including lupus, is   recommended, if not previously   performed.     Does not appear pt has had these labs. Will place future orders.   No family history of Lupus. Spoke with pt about this and she states will make lab only visit.   Jovana Hodges PA-C

## 2018-04-10 DIAGNOSIS — I88.1 KIKUCHI DISEASE: ICD-10-CM

## 2018-04-10 DIAGNOSIS — I88.1 HISTIOCYTIC NECROTIZING LYMPHADENITIS: ICD-10-CM

## 2018-04-10 LAB
CRP SERPL-MCNC: <2.9 MG/L (ref 0–8)
ERYTHROCYTE [SEDIMENTATION RATE] IN BLOOD BY WESTERGREN METHOD: 4 MM/H (ref 0–20)

## 2018-04-10 PROCEDURE — 36415 COLL VENOUS BLD VENIPUNCTURE: CPT | Performed by: PHYSICIAN ASSISTANT

## 2018-04-10 PROCEDURE — 86038 ANTINUCLEAR ANTIBODIES: CPT | Performed by: PHYSICIAN ASSISTANT

## 2018-04-10 PROCEDURE — 86140 C-REACTIVE PROTEIN: CPT | Performed by: PHYSICIAN ASSISTANT

## 2018-04-10 PROCEDURE — 85652 RBC SED RATE AUTOMATED: CPT | Performed by: PHYSICIAN ASSISTANT

## 2018-04-11 LAB — ANA SER QL IF: NEGATIVE

## 2018-05-03 LAB
MYCOBACTERIUM SPEC CULT: NORMAL
MYCOBACTERIUM SPEC CULT: NORMAL
SPECIMEN SOURCE: NORMAL

## 2018-06-28 ENCOUNTER — TELEPHONE (OUTPATIENT)
Dept: FAMILY MEDICINE | Facility: OTHER | Age: 28
End: 2018-06-28

## 2018-06-28 ENCOUNTER — OFFICE VISIT (OUTPATIENT)
Dept: FAMILY MEDICINE | Facility: OTHER | Age: 28
End: 2018-06-28

## 2018-06-28 ENCOUNTER — E-VISIT (OUTPATIENT)
Dept: FAMILY MEDICINE | Facility: OTHER | Age: 28
End: 2018-06-28

## 2018-06-28 VITALS
HEIGHT: 63 IN | SYSTOLIC BLOOD PRESSURE: 118 MMHG | RESPIRATION RATE: 18 BRPM | BODY MASS INDEX: 23.92 KG/M2 | HEART RATE: 102 BPM | WEIGHT: 135 LBS | DIASTOLIC BLOOD PRESSURE: 80 MMHG | TEMPERATURE: 98.3 F | OXYGEN SATURATION: 98 %

## 2018-06-28 DIAGNOSIS — J01.10 ACUTE NON-RECURRENT FRONTAL SINUSITIS: Primary | ICD-10-CM

## 2018-06-28 DIAGNOSIS — Z53.9 ERRONEOUS ENCOUNTER--DISREGARD: Primary | ICD-10-CM

## 2018-06-28 PROCEDURE — 99213 OFFICE O/P EST LOW 20 MIN: CPT | Performed by: FAMILY MEDICINE

## 2018-06-28 RX ORDER — NICOTINE 14MG/24HR
1 PATCH, TRANSDERMAL 24 HOURS TRANSDERMAL DAILY
COMMUNITY
End: 2023-06-17

## 2018-06-28 ASSESSMENT — PAIN SCALES - GENERAL: PAINLEVEL: NO PAIN (0)

## 2018-06-28 NOTE — PROGRESS NOTES
SUBJECTIVE:   Audrey Snell is a 28 year old female who presents to clinic today for the following health issues:    HPI     Acute Illness   Acute illness concerns: cough, sinus congestion  Onset: 6/25/18    Fever: no     Chills/Sweats: YES    Headache (location?): YES    Sinus Pressure:YES    Conjunctivitis:  no    Ear Pain: YES- left    Rhinorrhea: YES    Congestion: YES    Sore Throat: YES     Cough: YES    Wheeze: no     Decreased Appetite: YES    Nausea: no     Vomiting: no     Diarrhea:  yes     Dysuria/Freq.: no     Fatigue/Achiness: YES    Sick/Strep Exposure: YES     Therapies Tried and outcome: sudafed, robitussin, ibuprofen, zyrtec with mild relief    Sore throat Monday/Tuesday, but has since improved.       Problem list and histories reviewed & adjusted, as indicated.  Additional history: none    Patient Active Problem List   Diagnosis     CARDIOVASCULAR SCREENING; LDL GOAL LESS THAN 160     Anxiety     Moderate major depression (H)     Lumbar radiculopathy     Pain in joint, lower leg     Tobacco use disorder     Migraine     Irritable bowel syndrome with diarrhea     Iliotibial band syndrome, left knee     Past Surgical History:   Procedure Laterality Date     BIOPSY LYMPH NODE AXILLA  3/22/2012    Procedure:BIOPSY LYMPH NODE AXILLA; BIOPSY LYMPH NODE AXILLA ,RIGHT; Surgeon:MELANI DUNAWAY; Location:[facility]     none         Social History   Substance Use Topics     Smoking status: Current Every Day Smoker     Packs/day: 1.00     Years: 3.50     Types: Cigarettes     Smokeless tobacco: Never Used      Comment: 1 ppd     Alcohol use No     Family History   Problem Relation Age of Onset     Hypertension Mother      Hypertension Father      Cancer Maternal Grandfather      Asthma Sister      Family History Negative No family hx of      C.A.D. No family hx of      Diabetes No family hx of      Cerebrovascular Disease No family hx of      Breast Cancer No family hx of      Prostate Cancer No family  "hx of      Alcohol/Drug No family hx of      Allergies No family hx of      Alzheimer Disease No family hx of      Anesthesia Reaction No family hx of      Arthritis No family hx of      Blood Disease No family hx of      Cardiovascular No family hx of      Circulatory No family hx of      Congenital Anomalies No family hx of      Connective Tissue Disorder No family hx of      Depression No family hx of      Endocrine Disease No family hx of      Eye Disorder No family hx of      Genetic Disorder No family hx of      GASTROINTESTINAL DISEASE No family hx of      Genitourinary Problems No family hx of      Gynecology No family hx of      HEART DISEASE No family hx of      Lipids No family hx of      Musculoskeletal Disorder No family hx of      Neurologic Disorder No family hx of      Obesity No family hx of      Osteoperosis No family hx of      Psychotic Disorder No family hx of      Respiratory No family hx of      Thyroid Disease No family hx of      Hearing Loss No family hx of            ROS:  Constitutional, HEENT, cardiovascular, pulmonary, GI, , musculoskeletal, neuro, skin, endocrine and psych systems are negative, except as in HPI or otherwise noted.     This document serves as a record of the services and decisions personally performed and made by Maame Hudson MD. It was created on her behalf by Sharon Meier, a trained medical scribe. The creation of this document is based the provider's statements to the medical scribe.  Sharon Meier, June 28, 2018 3:50 PM     OBJECTIVE:                                                    /80  Pulse 102  Temp 98.3  F (36.8  C)  Resp 18  Ht 1.59 m (5' 2.6\")  Wt 61.2 kg (135 lb)  SpO2 98%  BMI 24.22 kg/m2  Body mass index is 24.22 kg/(m^2).   GENERAL: healthy, alert, well nourished, well hydrated, no distress  HENT: ear canals- normal; TMs- normal; Nose- normal; Mouth- mild posterior erythema; Left frontal sinus pressure  NECK: anterior cervical adenopathy  RESP: " lungs clear to auscultation - no rales, no rhonchi, no wheezes  CV: regular rates and rhythm, normal S1 S2, no S3 or S4 and no murmur, no click or rub  SKIN: no suspicious lesions, no rashes to visible skin  PSYCH: Alert and oriented times 3; speech- coherent , normal rate and volume; able to articulate logical thoughts, able to abstract reason, no tangential thoughts, no hallucinations or delusions, affect- normal    Diagnostic test results:  No results found for this or any previous visit (from the past 24 hour(s)).     ASSESSMENT/PLAN:                                                        ICD-10-CM    1. Acute non-recurrent frontal sinusitis J01.10 amoxicillin-clavulanate (AUGMENTIN) 875-125 MG per tablet     Sinusitis: She has significant sinus pressure in the left frontal sinus, consistent with a sinus infection. Will prescribe Augmentin to treat. Advised to take a probiotic if she develops any diarrhea while taking this course. F/u if symptoms worsen or fail to improve.      Patient Instructions   Follow up if your symptoms do not improve or worsen after your antibiotic course. Please call for a prescription if you develop vaginal itchiness or discharge from your antibiotic use.  Consider probiotics to reduce diarrhea from antibiotic.       The information in this document, created by the medical scribe for me, accurately reflects the services I personally performed and the decisions made by me. I have reviewed and approved this document for accuracy.     Maame Hudson MD, MD  [facility]

## 2018-06-28 NOTE — MR AVS SNAPSHOT
After Visit Summary   6/28/2018    Audrey Snell    MRN: [id number]           Patient Information     Date Of Birth          1990        Visit Information        Provider Department      6/28/2018 3:15 PM Maame Hudson MD [facility]        Today's Diagnoses     Acute non-recurrent frontal sinusitis    -  1      Care Instructions    Follow up if your symptoms do not improve or worsen after your antibiotic course. Please call for a prescription if you develop vaginal itchiness or discharge from your antibiotic use.  Consider probiotics to reduce diarrhea from antibiotic.           Follow-ups after your visit        Follow-up notes from your care team     Return if symptoms worsen or fail to improve.      Who to contact     If you have questions or need follow up information about today's clinic visit or your schedule please contact [facility] directly at [phone].  Normal or non-critical lab and imaging results will be communicated to you by [employer], letter or phone within 4 business days after the clinic has received the results. If you do not hear from us within 7 days, please contact the clinic through [employer] or phone. If you have a critical or abnormal lab result, we will notify you by phone as soon as possible.  Submit refill requests through [employer] or call your pharmacy and they will forward the refill request to us. Please allow 3 business days for your refill to be completed.          Additional Information About Your Visit        MyChart Information     [employer] gives you secure access to your electronic health record. If you see a primary care provider, you can also send messages to your care team and make appointments. If you have questions, please call your primary care clinic.  If you do not have a primary care provider, please call [phone] and they will assist you.        Care EveryWhere ID     This is your Care EveryWhere ID. This could be  "used by other organizations to access your [facility] medical records  [id number]        Your Vitals Were     Pulse Temperature Respirations Height Pulse Oximetry BMI (Body Mass Index)    102 98.3  F (36.8  C) 18 1.59 m (5' 2.6\") 98% 24.22 kg/m2       Blood Pressure from Last 3 Encounters:   06/28/18 118/80   03/28/18 140/80   03/02/18 134/70    Weight from Last 3 Encounters:   06/28/18 61.2 kg (135 lb)   03/28/18 65.8 kg (145 lb)   03/02/18 64 kg (141 lb)              Today, you had the following     No orders found for display         Today's Medication Changes          These changes are accurate as of 6/28/18  3:58 PM.  If you have any questions, ask your nurse or doctor.               Start taking these medicines.        Dose/Directions    amoxicillin-clavulanate 875-125 MG per tablet   Commonly known as:  AUGMENTIN   Used for:  Acute non-recurrent frontal sinusitis   Started by:  Maame Hudson MD        Dose:  1 tablet   Take 1 tablet by mouth 2 times daily   Quantity:  20 tablet   Refills:  0            Where to get your medicines      These medications were sent to [id number]  [place] - [address]  [address], [place] [address]    Hours:  test fax sent successfully 1/20/04   Phone:  [phone]     amoxicillin-clavulanate 875-125 MG per tablet                Primary Care Provider Office Phone # Fax #    Maame Hudson [phone]       [address] [place] [address]        Equal Access to Services     [facility] ERICA AH: Hadcooper Umaña, maria ines don, qaignacia weston. So [facility] [phone].    ATENCIÓN: Si habla español, tiene a lr disposición servicios gratuitos de asistencia lingüística. Llame al [phone].    We comply with applicable federal civil rights laws and [place] laws. We do not discriminate on the basis of race, color, national origin, " age, disability, sex, sexual orientation, or gender identity.            Thank you!     Thank you for choosing [facility]  for your care. Our goal is always to provide you with excellent care. Hearing back from our patients is one way we can continue to improve our services. Please take a few minutes to complete the written survey that you may receive in the mail after your visit with us. Thank you!             Your Updated Medication List - Protect others around you: Learn how to safely use, store and throw away your medicines at www.disposemymeds.org.          This list is accurate as of 6/28/18  3:58 PM.  Always use your most recent med list.                   Brand Name Dispense Instructions for use Diagnosis    amoxicillin-clavulanate 875-125 MG per tablet    AUGMENTIN    20 tablet    Take 1 tablet by mouth 2 times daily    Acute non-recurrent frontal sinusitis       guaiFENesin 20 mg/mL Soln solution    ROBITUSSIN     Take 10 mLs by mouth every 4 hours as needed for cough        IBUPROFEN PO           MOVE FREE PO      Take 1 tablet by mouth        MULTIVITAMIN PO           Probiotic 250 MG Caps      Take 1 tablet by mouth daily        SUDAFED PO           SUMAtriptan 25 MG tablet    IMITREX    9 tablet    TAKE 1 TO 2 TABLETS BY MOUTH AT ONSET OF HEADACHE FOR MIGRAINE. MAY REPEAT DOSE IN 2 HOURS. DO NOT EXCEED 200MG IN 24 HOURS    Migraine without status migrainosus, not intractable, unspecified migraine type       ZYRTEC ALLERGY PO

## 2018-06-28 NOTE — TELEPHONE ENCOUNTER
Scheduled pt to see Dr. Hudson today.  Next 5 appointments (look out 90 days)     Jun 28, 2018  3:15 PM CDT   [facility] Hudson MD   [facility]    [address] [place] [id number]   [phone]                  Megan Cheek RN

## 2018-06-28 NOTE — TELEPHONE ENCOUNTER
Rec'd evisit request. Please reach out to see if she can be seen in office as her respiratory symptoms would be better evaluated here. If she cannot, we can try to mitigate as best we can with the evisit, but her tightness and SOB sound like a o2 and resp rate, etc would be helpful  Maame Hudson MD

## 2018-06-28 NOTE — TELEPHONE ENCOUNTER
Audrey Snell is a 28 year old female who calls with cough, congestion.    NURSING ASSESSMENT:  Description:  Pt has requested an e-visit due to cough, congestion, sinus pressure  Onset/duration:  3 days   Precip. factors:  none  Associated symptoms:  Productive cough (green mucus), nasal drainage (green), chest congestion, sinus pressure.  Denies fever, difficulty breathing, shortness of breath, vision changes, coughing up blood, chest pain.  Improves/worsens symptoms:  none  Pain scale (0-10)   3/10    Allergies: No Known Allergies      NURSING PLAN: Routed to provider Yes    RECOMMENDED DISPOSITION:  TBD.  Pt is able to come in today if needed.  Will comply with recommendation: Yes  If further questions/concerns or if symptoms do not improve, worsen or new symptoms develop, call your PCP or [facility] as soon as possible.      Guideline used: sinus problems, cough  Telephone Triage Protocols for Nurses, Fifth Edition, Audrey Cheek RN

## 2018-06-28 NOTE — PATIENT INSTRUCTIONS
Follow up if your symptoms do not improve or worsen after your antibiotic course. Please call for a prescription if you develop vaginal itchiness or discharge from your antibiotic use.  Consider probiotics to reduce diarrhea from antibiotic.

## 2018-07-18 ENCOUNTER — TELEPHONE (OUTPATIENT)
Dept: FAMILY MEDICINE | Facility: OTHER | Age: 28
End: 2018-07-18

## 2018-07-18 NOTE — TELEPHONE ENCOUNTER
Summary:    Patient is due/failing the following:   Physical, LDL, PAP and PHQ9    Action needed:   Patient needs office visit for PAP., Patient needs to do PHQ9. and Patient needs fasting lab only appointment    Type of outreach:    Phone, spoke to patient.  patient will call back to schedule     Questions for provider review:    None                                                                                                                                    Chana Fink     Chart routed to Care Team .          Panel Management Review      Patient has the following on her problem list:     Depression / Dysthymia review    Measure:  Needs PHQ-9 score of 4 or less during index window.  Administer PHQ-9 and if score is 5 or more, send encounter to provider for next steps.        PHQ-9 SCORE 4/15/2015 6/22/2015 3/2/2018   Total Score 12 6 -   Total Score MyChart - - 4 (Minimal depression)   Total Score - - 4       If PHQ-9 recheck is 5 or more, route to provider for next steps.    Patient is due for:  PHQ9      Composite cancer screening  Chart review shows that this patient is due/due soon for the following Pap Smear

## 2019-01-18 ENCOUNTER — TELEPHONE (OUTPATIENT)
Dept: FAMILY MEDICINE | Facility: OTHER | Age: 29
End: 2019-01-18

## 2019-01-18 NOTE — TELEPHONE ENCOUNTER
Summary:    Patient is due/failing the following:   Physical, LDL, PAP and PHQ9    Action needed:   Patient needs office visit for PAP/PE., Patient needs to do PHQ9. and Patient needs fasting lab only appointment    Type of outreach:    Phone, left message for patient to call back.     Questions for provider review:    None                                                                                                                                    Chana Fink     Chart routed to Care Team .          Panel Management Review      Patient has the following on her problem list:     Depression / Dysthymia review    Measure:  Needs PHQ-9 score of 4 or less during index window.  Administer PHQ-9 and if score is 5 or more, send encounter to provider for next steps.        PHQ-9 SCORE 4/15/2015 6/22/2015 3/2/2018   PHQ-9 Total Score 12 6 -   PHQ-9 Total Score MyChart - - 4 (Minimal depression)   PHQ-9 Total Score - - 4       If PHQ-9 recheck is 5 or more, route to provider for next steps.    Patient is due for:  PHQ9      Composite cancer screening  Chart review shows that this patient is due/due soon for the following Pap Smear

## 2020-02-17 ENCOUNTER — OFFICE VISIT (OUTPATIENT)
Dept: FAMILY MEDICINE | Facility: CLINIC | Age: 30
End: 2020-02-17

## 2020-02-17 ENCOUNTER — ANCILLARY PROCEDURE (OUTPATIENT)
Dept: GENERAL RADIOLOGY | Facility: CLINIC | Age: 30
End: 2020-02-17
Attending: NURSE PRACTITIONER

## 2020-02-17 VITALS
HEIGHT: 63 IN | SYSTOLIC BLOOD PRESSURE: 116 MMHG | HEART RATE: 76 BPM | DIASTOLIC BLOOD PRESSURE: 86 MMHG | WEIGHT: 148 LBS | RESPIRATION RATE: 16 BRPM | BODY MASS INDEX: 26.22 KG/M2 | TEMPERATURE: 98.5 F | OXYGEN SATURATION: 100 %

## 2020-02-17 DIAGNOSIS — F17.200 TOBACCO USE DISORDER: ICD-10-CM

## 2020-02-17 DIAGNOSIS — M77.8 TENDONITIS OF WRIST, LEFT: Primary | ICD-10-CM

## 2020-02-17 DIAGNOSIS — M25.532 LEFT WRIST PAIN: ICD-10-CM

## 2020-02-17 PROCEDURE — 73110 X-RAY EXAM OF WRIST: CPT | Mod: LT

## 2020-02-17 PROCEDURE — 96372 THER/PROPH/DIAG INJ SC/IM: CPT | Performed by: NURSE PRACTITIONER

## 2020-02-17 PROCEDURE — 99214 OFFICE O/P EST MOD 30 MIN: CPT | Mod: 25 | Performed by: NURSE PRACTITIONER

## 2020-02-17 RX ORDER — KETOROLAC TROMETHAMINE 30 MG/ML
30 INJECTION, SOLUTION INTRAMUSCULAR; INTRAVENOUS ONCE
Status: COMPLETED | OUTPATIENT
Start: 2020-02-17 | End: 2020-02-17

## 2020-02-17 RX ORDER — ONDANSETRON 4 MG/1
4 TABLET, ORALLY DISINTEGRATING ORAL
COMMUNITY
Start: 2019-06-11 | End: 2020-06-30

## 2020-02-17 RX ADMIN — KETOROLAC TROMETHAMINE 30 MG: 30 INJECTION, SOLUTION INTRAMUSCULAR; INTRAVENOUS at 14:29

## 2020-02-17 ASSESSMENT — PATIENT HEALTH QUESTIONNAIRE - PHQ9
SUM OF ALL RESPONSES TO PHQ QUESTIONS 1-9: 5
SUM OF ALL RESPONSES TO PHQ QUESTIONS 1-9: 5
10. IF YOU CHECKED OFF ANY PROBLEMS, HOW DIFFICULT HAVE THESE PROBLEMS MADE IT FOR YOU TO DO YOUR WORK, TAKE CARE OF THINGS AT HOME, OR GET ALONG WITH OTHER PEOPLE: SOMEWHAT DIFFICULT

## 2020-02-17 ASSESSMENT — ANXIETY QUESTIONNAIRES
2. NOT BEING ABLE TO STOP OR CONTROL WORRYING: SEVERAL DAYS
3. WORRYING TOO MUCH ABOUT DIFFERENT THINGS: SEVERAL DAYS
6. BECOMING EASILY ANNOYED OR IRRITABLE: SEVERAL DAYS
1. FEELING NERVOUS, ANXIOUS, OR ON EDGE: MORE THAN HALF THE DAYS
5. BEING SO RESTLESS THAT IT IS HARD TO SIT STILL: NOT AT ALL
GAD7 TOTAL SCORE: 6
4. TROUBLE RELAXING: NOT AT ALL
7. FEELING AFRAID AS IF SOMETHING AWFUL MIGHT HAPPEN: SEVERAL DAYS
GAD7 TOTAL SCORE: 6
7. FEELING AFRAID AS IF SOMETHING AWFUL MIGHT HAPPEN: SEVERAL DAYS
GAD7 TOTAL SCORE: 6

## 2020-02-17 ASSESSMENT — MIFFLIN-ST. JEOR: SCORE: 1359.1

## 2020-02-17 NOTE — NURSING NOTE
Clinic Administered Medication Documentation    MEDICATION LIST:   Injectable Medication Documentation    Patient was given Ketorolac Tromethamine (Toradol). Prior to medication administration, verified patients identity using patient s name and date of birth. Please see MAR and medication order for additional information. Patient instructed to remain in clinic for 15 minutes.      Was entire vial of medication used? Yes  Vial/Syringe: Single dose vial  Expiration Date:  09/21  Was this medication supplied by the patient? No

## 2020-02-17 NOTE — LETTER
February 17, 2020      Audrey Snell  [address]  [place] [address]        To Whom It May Concern:    Audrey Snell  was seen on 2/17/2020.  Please allow work modifications to include no lifting, pushing, or pulling with her left hand for 1 week due to injury.        Sincerely,        Sally Duncan, CNP

## 2020-02-17 NOTE — PATIENT INSTRUCTIONS
Suspect wrist tendonitis at this point - especially with repetitive motions at work.  Rest, ice, splint, recommend scheduled Aleve (Naproxen Sodium).  Work modification letter written for work.  I will call you with your official X-Ray results/further evaluation/management if needed.  Please call or return to clinic for any questions, concerns, or symptoms that are not improving or becoming worse.    Sally Duncan, CNP        Patient Education     Hand and Wrist Exercises: Forearm Roll  This exercise is designed to stretch and strengthen your hands and wrists. Before beginning, read through all the instructions. While exercising, breathe normally. If you feel any pain, stop the exercise. If pain persists, inform your healthcare provider.      Grasp a hammer or hand weight in your _____ hand. Place your wrist, palm down, over the end of your knee or on the edge of a table.    Keeping your forearm against your thigh or a table, rotate your hand until your palm is up. Hold for _____ seconds. Then return to starting position.    Repeat _____ times. Do _____ sets a day.  Date Last Reviewed: 10/1/2017    [id number] [employer]. [address] [place] [address]. All rights reserved. This information is not intended as a substitute for professional medical care. Always follow your healthcare professional's instructions.           Patient Education     Wrist Pronation (Strength)    These instructions are for your right elbow. Switch sides for your left elbow.   1. Sit in a chair next to a table. Rest your right forearm on the table. Hang your right wrist off the edge of the table, palm up. Hold a hand weight in your right hand. Your healthcare provider will tell you what size of hand weight to use.  2. Keep your forearm in place and turn your wrist to the left until your thumb is on top.  3. Slowly lower the hand weight back down to the right.  4. Repeat 10 times, or as instructed.  Date Last  Reviewed: 3/10/2016    [id number] [employer] [address] [place] [address], [place] [address]. All rights reserved. This information is not intended as a substitute for professional medical care. Always follow your healthcare professional's instructions.

## 2020-02-17 NOTE — PROGRESS NOTES
"Subjective     Audrey Snell is a 29 year old female who presents to clinic today for the following health issues:    History of Present Illness        She eats 0-1 servings of fruits and vegetables daily.She consumes 1 sweetened beverage(s) daily.She exercises with enough effort to increase her heart rate 60 or more minutes per day.  She exercises with enough effort to increase her heart rate 4 days per week.   She is taking medications regularly.     Joint Pain    Onset: Last night     Description:   Location: Left wrist   Character: Burning pain     Intensity: 8/10 on pain scale.      Progression of Symptoms: worse    Accompanying Signs & Symptoms:  Other symptoms: numbness and swelling  in left arm     History:   Previous similar pain: no       Precipitating factors:   Trauma or overuse: YES    Alleviating factors:  Improved by: nothing    Therapies Tried and outcome: Ibuprofen , no relief.      Additional HPI:  - diagnosed with inflamed lymph nodes in left axilla - 3 years ago  - no previous trauma/injury to left arm   - no trauma/injury/fall this episode   - works as a / for [employer]  - went to set a serving tray down and pain started at wrist to elbow  - no numbness/tingling to left hand today, but did have some yesterday  - describes pain as a \"burning sensation\"    Patient Active Problem List   Diagnosis     CARDIOVASCULAR SCREENING; LDL GOAL LESS THAN 160     Anxiety     Moderate major depression (H)     Lumbar radiculopathy     Pain in joint, lower leg     Tobacco use disorder     Migraine     Irritable bowel syndrome with diarrhea     Iliotibial band syndrome, left knee     Past Surgical History:   Procedure Laterality Date     BIOPSY LYMPH NODE AXILLA  3/22/2012    Procedure:BIOPSY LYMPH NODE AXILLA; BIOPSY LYMPH NODE AXILLA ,RIGHT; Surgeon:MELANI DUNAWAY; Location: OR     none         Social History     Tobacco Use     Smoking status: Current Every Day Smoker     Packs/day: " 1.00     Years: 3.50     Pack years: 3.50     Types: Cigarettes     Smokeless tobacco: Never Used     Tobacco comment: 1 ppd   Substance Use Topics     Alcohol use: No     Family History   Problem Relation Age of Onset     Hypertension Mother      Hypertension Father      Cancer Maternal Grandfather      Asthma Sister      Family History Negative No family hx of      C.A.D. No family hx of      Diabetes No family hx of      Cerebrovascular Disease No family hx of      Breast Cancer No family hx of      Prostate Cancer No family hx of      Alcohol/Drug No family hx of      Allergies No family hx of      Alzheimer Disease No family hx of      Anesthesia Reaction No family hx of      Arthritis No family hx of      Blood Disease No family hx of      Cardiovascular No family hx of      Circulatory No family hx of      Congenital Anomalies No family hx of      Connective Tissue Disorder No family hx of      Depression No family hx of      Endocrine Disease No family hx of      Eye Disorder No family hx of      Genetic Disorder No family hx of      Gastrointestinal Disease No family hx of      Genitourinary Problems No family hx of      Gynecology No family hx of      Heart Disease No family hx of      Lipids No family hx of      Musculoskeletal Disorder No family hx of      Neurologic Disorder No family hx of      Obesity No family hx of      Osteoporosis No family hx of      Psychotic Disorder No family hx of      Respiratory No family hx of      Thyroid Disease No family hx of      Hearing Loss No family hx of          Current Outpatient Medications   Medication Sig Dispense Refill     Cetirizine HCl (ZYRTEC ALLERGY PO)        Glucosamine-Chondroitin (MOVE FREE PO) Take 1 tablet by mouth       IBUPROFEN PO        Multiple Vitamins-Minerals (MULTIVITAMIN PO)        ondansetron (ZOFRAN-ODT) 4 MG ODT tab Take 4 mg by mouth       Pseudoephedrine HCl (SUDAFED PO)        Saccharomyces boulardii (PROBIOTIC) 250 MG CAPS Take 1  "tablet by mouth daily       No Known Allergies  Recent Labs   Lab Test 06/22/15  1858 10/29/14  1359   ALT  --  25   CR  --  0.66   GFRESTIMATED  --  >90  Non  GFR Calc     GFRESTBLACK  --  >90   GFR Calc     POTASSIUM  --  4.4   TSH 2.23 2.41      BP Readings from Last 3 Encounters:   02/17/20 116/86   06/28/18 118/80   03/28/18 140/80    Wt Readings from Last 3 Encounters:   02/17/20 67.1 kg (148 lb)   06/28/18 61.2 kg (135 lb)   03/28/18 65.8 kg (145 lb)                      Reviewed and updated as needed this visit by Provider  Tobacco  Allergies  Meds  Problems  Med Hx  Surg Hx  Fam Hx         Review of Systems   ROS COMP: Constitutional, HEENT, cardiovascular, pulmonary, gi and gu systems are negative, except as otherwise noted.      Objective    /86   Pulse 76   Temp 98.5  F (36.9  C) (Temporal)   Resp 16   Ht 1.59 m (5' 2.6\")   Wt 67.1 kg (148 lb)   LMP 02/06/2020 (Exact Date)   SpO2 100%   Breastfeeding No   BMI 26.55 kg/m    Body mass index is 26.55 kg/m .  Physical Exam   GENERAL: healthy, alert and no distress  MS: LUE exam shows normal strength and muscle mass, no deformities, no erythema, induration, or nodules, no evidence of joint effusion and no evidence of joint instability, mild edema noted over anterior portion of wrist, no ecchymosis, mild decreased range of motion, good radial pulse and capillary refill of all fingers  SKIN: no suspicious lesions or rashes  NEURO: Normal strength and tone, mentation intact and speech normal  PSYCH: mentation appears normal, affect normal/bright    Diagnostic Test Results:  Labs reviewed in Epic  Xray - Left Wrist  Study Result   Order   XR Wrist Left G/E 3 Views [[id number]] (Order [id number])   Exam Information     Exam Date Exam Time Accession # Performing Department Results    2/17/20  2:25 PM [id number] [facility]    PACS Images      Show images for XR Wrist Left G/E 3 Views   Study " Result     XR LEFT WRIST THREE OR MORE VIEWS   2/17/2020 2:25 PM      HISTORY: 29-year old female with wrist/forearm pain, more medial side,  repetitive use x 10 years as a /. Left wrist pain.     COMPARISON: None.       FINDINGS: There is no fracture. Joint spaces are well maintained.  Pronator quadratus fat pad is of normal morphology.                                                                      IMPRESSION: Negative left wrist x-rays. Further evaluation with MRI  may be helpful, as clinically indicated. Site of symptoms should also  be marked on the skin or images at that time.     I discussed the negative findings and recommendation for MRI, as  clinically indicated, with Sally Duncan on 2/17/2020 at 2:37 PM.     BRIGHT ASHTON MD             Assessment & Plan     1. Tendonitis of wrist, left  Toradol injection administered today in clinic for pain management as she does have to work tonight.  Work letter written to allow activity restrictions at work.  Recommended rest, wrist splint, ice, and scheduled Naproxen every 12 hours.  With history, advised Audrey to follow-up for symptoms that are not improving or becoming worse.  Recommended to let me know by the beginning of March (2 weeks) as I will no longer be here at the clinic.  I wrote this time frame on her AVS.  Discussed ordering an MRI to evaluate/manage further at that time.  Home care measures discussed in detail and these were included in her AVS.  Advised to follow-up for symptoms that are not improving or becoming worse.  Patient voiced understanding and agreement with treatment plan.    - XR Wrist Left G/E 3 Views; Future  - ketorolac (TORADOL) injection 30 mg    2. Left wrist pain  - XR Wrist Left G/E 3 Views; Future    3. Tobacco use disorder  Audrey has been a pack-per-day smoker for almost 4 years.  Declines tobacco cessation information today.       Tobacco Cessation:   reports that she has been smoking cigarettes. She has a  "3.50 pack-year smoking history. She has never used smokeless tobacco.  Tobacco Cessation Action Plan: Information offered: Patient not interested at this time      BMI:   Estimated body mass index is 26.55 kg/m  as calculated from the following:    Height as of this encounter: 1.59 m (5' 2.6\").    Weight as of this encounter: 67.1 kg (148 lb).           Patient Instructions   Suspect wrist tendonitis at this point - especially with repetitive motions at work.  Rest, ice, splint, recommend scheduled Aleve (Naproxen Sodium).  Work modification letter written for work.  I will call you with your official X-Ray results/further evaluation/management if needed.  Please call or return to clinic for any questions, concerns, or symptoms that are not improving or becoming worse.    Sally Duncan, CNP        Patient Education     Hand and Wrist Exercises: Forearm Roll  This exercise is designed to stretch and strengthen your hands and wrists. Before beginning, read through all the instructions. While exercising, breathe normally. If you feel any pain, stop the exercise. If pain persists, inform your healthcare provider.      Grasp a hammer or hand weight in your _____ hand. Place your wrist, palm down, over the end of your knee or on the edge of a table.    Keeping your forearm against your thigh or a table, rotate your hand until your palm is up. Hold for _____ seconds. Then return to starting position.    Repeat _____ times. Do _____ sets a day.  Date Last Reviewed: 10/1/2017    0590-1630 [employer]. [address] [place] [address]. All rights reserved. This information is not intended as a substitute for professional medical care. Always follow your healthcare professional's instructions.           Patient Education     Wrist Pronation (Strength)    These instructions are for your right elbow. Switch sides for your left elbow.   1. Sit in a chair next to a table. Rest your right forearm on the " table. Hang your right wrist off the edge of the table, palm up. Hold a hand weight in your right hand. Your healthcare provider will tell you what size of hand weight to use.  2. Keep your forearm in place and turn your wrist to the left until your thumb is on top.  3. Slowly lower the hand weight back down to the right.  4. Repeat 10 times, or as instructed.  Date Last Reviewed: 3/10/2016    3048-1024 [employer]. [address] [place] [address]. All rights reserved. This information is not intended as a substitute for professional medical care. Always follow your healthcare professional's instructions.               Return in about 1 week (around 2/24/2020) for Symptoms Not Improving/Getting Worse.    Sally Duncan, CNP  [facility]      Answers for HPI/ROS submitted by the patient on 2/17/2020   Chronic problems general questions HPI Form  If you checked off any problems, how difficult have these problems made it for you to do your work, take care of things at home, or get along with other people?: Somewhat difficult  PHQ9 TOTAL SCORE: 5  VERONICA 7 TOTAL SCORE: 6

## 2020-02-18 ASSESSMENT — ANXIETY QUESTIONNAIRES: GAD7 TOTAL SCORE: 6

## 2020-02-18 ASSESSMENT — PATIENT HEALTH QUESTIONNAIRE - PHQ9: SUM OF ALL RESPONSES TO PHQ QUESTIONS 1-9: 5

## 2020-03-01 ENCOUNTER — HEALTH MAINTENANCE LETTER (OUTPATIENT)
Age: 30
End: 2020-03-01

## 2020-05-29 ENCOUNTER — E-VISIT (OUTPATIENT)
Dept: FAMILY MEDICINE | Facility: CLINIC | Age: 30
End: 2020-05-29

## 2020-05-29 DIAGNOSIS — R59.1 LYMPHADENOPATHY: Primary | ICD-10-CM

## 2020-05-29 PROCEDURE — 99421 OL DIG E/M SVC 5-10 MIN: CPT | Performed by: PHYSICIAN ASSISTANT

## 2020-05-29 RX ORDER — CLINDAMYCIN HCL 300 MG
300 CAPSULE ORAL 3 TIMES DAILY
Qty: 30 CAPSULE | Refills: 0 | Status: SHIPPED | OUTPATIENT
Start: 2020-05-29 | End: 2020-10-29

## 2020-05-29 NOTE — TELEPHONE ENCOUNTER
Provider E-Visit time total (minutes): 6 minutes    No ear pain specifically.    Pain is from neck and swelling.     Has cavity in this region.       Jaw bone pain- no.       No fever.       Has HA X 2 days.       Last dental visit- within the year.       treating for lymphadenopathy, poss abscess tooth/ear pain virtually due to Covid.     Warning signs and need for follow-up if not improving discussed.

## 2020-06-29 ENCOUNTER — E-VISIT (OUTPATIENT)
Dept: FAMILY MEDICINE | Facility: CLINIC | Age: 30
End: 2020-06-29

## 2020-06-29 DIAGNOSIS — Z53.9 ERRONEOUS ENCOUNTER--DISREGARD: Primary | ICD-10-CM

## 2020-06-29 NOTE — TELEPHONE ENCOUNTER
End of May started to have swelling in left neck lymph nodes. Was hard to open mouth. Did virtual visit- Annia Novak DNP. Got on antibiotics clindamycin and it helped. Went down but not back to normal. Still visible.   Swelling over neck and pressure feeling- front left.   Radiates up to ear.   Then 5d ago- got worse again.     No fevers.   I don't  Feel sick. No sore throat.  Pressure in area when swallow.   No swelling cheeks/face.   No skin redness.    No weight loss.   +loss of appetite.   +nightsweats-- not new necessarily or worse. I always sweat in my sleep.    Migraine last week. Hx of migraines.     Working- .    Provider E-Visit time total (minutes): - converted to an in person visit. DANIELE HernandezC

## 2020-06-30 ENCOUNTER — OFFICE VISIT (OUTPATIENT)
Dept: FAMILY MEDICINE | Facility: OTHER | Age: 30
End: 2020-06-30

## 2020-06-30 VITALS
BODY MASS INDEX: 26.12 KG/M2 | HEIGHT: 63 IN | TEMPERATURE: 98.9 F | RESPIRATION RATE: 16 BRPM | OXYGEN SATURATION: 99 % | SYSTOLIC BLOOD PRESSURE: 130 MMHG | WEIGHT: 147.4 LBS | DIASTOLIC BLOOD PRESSURE: 80 MMHG | HEART RATE: 74 BPM

## 2020-06-30 DIAGNOSIS — K11.20 SIALOADENITIS OF SUBMANDIBULAR GLAND: Primary | ICD-10-CM

## 2020-06-30 DIAGNOSIS — R59.1 LYMPHADENOPATHY: ICD-10-CM

## 2020-06-30 LAB
BASOPHILS # BLD AUTO: 0 10E9/L (ref 0–0.2)
BASOPHILS NFR BLD AUTO: 0.6 %
DIFFERENTIAL METHOD BLD: ABNORMAL
EOSINOPHIL # BLD AUTO: 0 10E9/L (ref 0–0.7)
EOSINOPHIL NFR BLD AUTO: 0.6 %
ERYTHROCYTE [DISTWIDTH] IN BLOOD BY AUTOMATED COUNT: 12.5 % (ref 10–15)
HCT VFR BLD AUTO: 39.7 % (ref 35–47)
HGB BLD-MCNC: 13.9 G/DL (ref 11.7–15.7)
LYMPHOCYTES # BLD AUTO: 1.5 10E9/L (ref 0.8–5.3)
LYMPHOCYTES NFR BLD AUTO: 43.3 %
MCH RBC QN AUTO: 30.2 PG (ref 26.5–33)
MCHC RBC AUTO-ENTMCNC: 35 G/DL (ref 31.5–36.5)
MCV RBC AUTO: 86 FL (ref 78–100)
MONOCYTES # BLD AUTO: 0.4 10E9/L (ref 0–1.3)
MONOCYTES NFR BLD AUTO: 12.6 %
NEUTROPHILS # BLD AUTO: 1.5 10E9/L (ref 1.6–8.3)
NEUTROPHILS NFR BLD AUTO: 42.9 %
PLATELET # BLD AUTO: 246 10E9/L (ref 150–450)
RBC # BLD AUTO: 4.61 10E12/L (ref 3.8–5.2)
WBC # BLD AUTO: 3.4 10E9/L (ref 4–11)

## 2020-06-30 PROCEDURE — 99214 OFFICE O/P EST MOD 30 MIN: CPT | Performed by: PHYSICIAN ASSISTANT

## 2020-06-30 PROCEDURE — 85025 COMPLETE CBC W/AUTO DIFF WBC: CPT | Performed by: PHYSICIAN ASSISTANT

## 2020-06-30 PROCEDURE — 36415 COLL VENOUS BLD VENIPUNCTURE: CPT | Performed by: PHYSICIAN ASSISTANT

## 2020-06-30 ASSESSMENT — PAIN SCALES - GENERAL: PAINLEVEL: MODERATE PAIN (4)

## 2020-06-30 ASSESSMENT — MIFFLIN-ST. JEOR: SCORE: 1351.38

## 2020-06-30 NOTE — PATIENT INSTRUCTIONS
Area of concern is the salivary gland on the left     Reduce smoking    Can try the lemon candy you used before.     Patient Education     Salivary Gland Swelling, Uncertain Cause  Salivary glands make saliva in response to food in your mouth. Saliva is mostly water. It also has minerals and proteins that help break down food and keep the mouth and teeth healthy. There are three pairs of salivary glands:    Parotid glands (in front of the ear)    Submandibular glands (below the jaw)    Sublingual glands (below the tongue)  Each gland has a duct (channel) that carries saliva from the gland into the mouth.   Swelling of the salivary glands can sometimes occur. Causes can include:    Viral infection (such as childhood mumps)    Bacterial infections    Sjögren's syndrome    Diabetes    Malnutrition    Sarcoidosis    Blockage of the salivary duct (from stones or tumors)  Certain medicines can affect salivary flow. This can lead to swelling of the gland. Be sure to tell your healthcare provider about all of the medicines you take.  Tests are being done to determine the cause of the swelling. These may include blood tests, X-ray, ultrasound, CT scan, or injection of dye into the duct to look for blockage. Treatment depends on the exact cause of the swelling.  Home care    If the area is painful, you can take over-the-counter medicines, such as acetaminophen or ibuprofen, unless you were prescribed another medicine. Wetting a cloth with warm water and putting it over the affected gland for 10-15 minutes at a time can also help ease pain.    To help prevent blockages and infections:  ? Drink 6-8 glasses of fluid per day (such as water, tea, and clear soup) to keep well hydrated.  ? If you smoke, ask your healthcare provider for help to quit. Smoking makes salivary gland stones more likely.  ? Maintain good dental hygiene. Brush and floss your teeth daily. See your dentist for regular cleanings.  Follow-up care  Follow up  with your healthcare provider or as advised. See your healthcare provider for further exams and testing. If you have been referred to a specialist, make an appointment promptly.  When to seek medical advice  Call your healthcare provider if any of the following occur:    Increasing pain or swelling in the gland    Inability to open mouth or pain when opening mouth    Fever of 100.4 F (38 C) or higher, or as directed by your healthcare provider    Redness over the gland    Pus draining into the mouth    Trouble breathing or swallowing    Any new symptoms  Prevention  Here are steps you can take to help prevent an infection:    Keep good hand washing habits.    Don t have close contact with people who have sore throats, colds, or other upper respiratory infections.    Don t smoke, and stay away from secondhand smoke.    Stay up to date with of your vaccines.  Date Last Reviewed: 11/1/2017 2000-2019 [employer]. [address] [place] [address]. All rights reserved. This information is not intended as a substitute for professional medical care. Always follow your healthcare professional's instructions.

## 2020-06-30 NOTE — PROGRESS NOTES
"Subjective     Audrey Snell is a 30 year old female who presents to clinic today for the following health issues:    HPI     Concern - swollen glands   Onset: Beginning of June - 1 month  Swelling under jaw- indicates left submandibular area and left anterior neck for 1 month.   Radiates pain to left ear and a small amount of pressure in left cheek/face.   Ears - wax issues in the past and ear has been painful. Decreased hearing on the left. No drainage drom ear.   No fevers, no chills.   Did virtual visit with Annia Novak 1 mo ago- given course of Clindamycin and told to see dentist- helped some but not resolved.  Saw dentist- told no abscess or dental source for infection.   Hx of parotitis on the right - \"I sucked lemon candies for awhile and it went away\". This time wwelling is not worse w/eating.   Worse at night after work.   Smoking 1 pack/day  No sore throat but pressure in the neck when swallowing on the left.   No CP, no SOB.   Has had migraines - hx of migraines, not new.  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   Working [employer].     Description:   Left sided neck and head tenderness, full feeling in ears     Intensity: moderate     Progression of Symptoms:  worsening    Accompanying Signs & Symptoms:  Headaches at night     Previous history of similar problem:   none    Precipitating factors:   Worsened by: wearing a mask at work     Alleviating factors:  Improved by: none  Therapies Tried and outcome: OTC pain meds don't make a difference       Patient Active Problem List   Diagnosis     CARDIOVASCULAR SCREENING; LDL GOAL LESS THAN 160     Anxiety     Moderate major depression (H)     Lumbar radiculopathy     Pain in joint, lower leg     Tobacco use disorder     Migraine     Irritable bowel syndrome with diarrhea     Iliotibial band syndrome, left knee     Past Surgical History:   Procedure Laterality Date     BIOPSY LYMPH NODE AXILLA  " 3/22/2012    Procedure:BIOPSY LYMPH NODE AXILLA; BIOPSY LYMPH NODE AXILLA ,RIGHT; Surgeon:MELANI DUNAWAY; Location:[facility]     none         Social History     Tobacco Use     Smoking status: Current Every Day Smoker     Packs/day: 1.00     Years: 3.50     Pack years: 3.50     Types: Cigarettes     Smokeless tobacco: Never Used     Tobacco comment: 1 ppd   Substance Use Topics     Alcohol use: No     Family History   Problem Relation Age of Onset     Hypertension Mother      Hypertension Father      Cancer Maternal Grandfather      Asthma Sister      Family History Negative No family hx of      C.A.D. No family hx of      Diabetes No family hx of      Cerebrovascular Disease No family hx of      Breast Cancer No family hx of      Prostate Cancer No family hx of      Alcohol/Drug No family hx of      Allergies No family hx of      Alzheimer Disease No family hx of      Anesthesia Reaction No family hx of      Arthritis No family hx of      Blood Disease No family hx of      Cardiovascular No family hx of      Circulatory No family hx of      Congenital Anomalies No family hx of      Connective Tissue Disorder No family hx of      Depression No family hx of      Endocrine Disease No family hx of      Eye Disorder No family hx of      Genetic Disorder No family hx of      Gastrointestinal Disease No family hx of      Genitourinary Problems No family hx of      Gynecology No family hx of      Heart Disease No family hx of      Lipids No family hx of      Musculoskeletal Disorder No family hx of      Neurologic Disorder No family hx of      Obesity No family hx of      Osteoporosis No family hx of      Psychotic Disorder No family hx of      Respiratory No family hx of      Thyroid Disease No family hx of      Hearing Loss No family hx of          Current Outpatient Medications   Medication Sig Dispense Refill     Cetirizine HCl (ZYRTEC ALLERGY PO)        Saccharomyces boulardii (PROBIOTIC) 250 MG CAPS Take 1 tablet  "by mouth daily       clindamycin (CLEOCIN) 300 MG capsule Take 1 capsule (300 mg) by mouth 3 times daily (Patient not taking: Reported on 6/30/2020) 30 capsule 0     Glucosamine-Chondroitin (MOVE FREE PO) Take 1 tablet by mouth       IBUPROFEN PO        Multiple Vitamins-Minerals (MULTIVITAMIN PO)        Pseudoephedrine HCl (SUDAFED PO)        No Known Allergies  BP Readings from Last 3 Encounters:   06/30/20 130/80   02/17/20 116/86   06/28/18 118/80    Wt Readings from Last 3 Encounters:   06/30/20 66.9 kg (147 lb 6.4 oz)   02/17/20 67.1 kg (148 lb)   06/28/18 61.2 kg (135 lb)                    Reviewed and updated as needed this visit by Provider         Review of Systems   Constitutional, HEENT, cardiovascular, pulmonary, gi and gu systems are negative, except as otherwise noted.      Objective    /80   Pulse 74   Temp 98.9  F (37.2  C) (Temporal)   Resp 16   Ht 1.59 m (5' 2.6\")   Wt 66.9 kg (147 lb 6.4 oz)   LMP 06/21/2020   SpO2 99%   BMI 26.45 kg/m    Body mass index is 26.45 kg/m .  Physical Exam   GENERAL: healthy, alert and no distress  EYES: Eyes grossly normal to inspection, PERRL and conjunctivae and sclerae normal  HENT: Normal cephalic/atraumatic. No swelling over left parotid, but mildly tender w/palpation. Tender left submandibular gland w/palpation. Right ear: canal clear and TM's normal, no effusion. Left ear: multiple piercings- no infection. No pain w/percuss over mastoid, pain w.manipulation of auricle or tragus.  Canal clear and TM's normal, no effusion.  No palpable preauricular LAD.  Nose mucosa: normal. Lips normal, no lesions. Buccal muscosa moist. Mild tenderness w/palpation of left bucal mucosa although no purulence or abnormality noted at stensons duct. Floor of mouth normal, no stones, purulence or pain w/palpation of whartons duct. Soft palate no lesions or ulcers. Tonsils normal, no erythema, no exudates. Uvula midline. Posterior oropharynx no erythema.   NECK: no " significant cervical adenopathy, no asymmetry, masses, or scars and thyroid normal to palpation  RESP: lungs clear to auscultation - no rales, rhonchi or wheezes  CV: regular rate and rhythm, normal S1 S2, no S3 or S4, no murmur, click or rub, no peripheral edema and peripheral pulses strong  MS: no gross musculoskeletal defects noted, no edema  SKIN: no suspicious lesions or rashes    Diagnostic Test Results:  Labs reviewed in Epic  Results for orders placed or performed in visit on 06/30/20   CBC with platelets and differential     Status: Abnormal   Result Value Ref Range    WBC 3.4 (L) 4.0 - 11.0 10e9/L    RBC Count 4.61 3.8 - 5.2 10e12/L    Hemoglobin 13.9 11.7 - 15.7 g/dL    Hematocrit 39.7 35.0 - 47.0 %    MCV 86 78 - 100 fl    MCH 30.2 26.5 - 33.0 pg    MCHC 35.0 31.5 - 36.5 g/dL    RDW 12.5 10.0 - 15.0 %    Platelet Count 246 150 - 450 10e9/L    % Neutrophils 42.9 %    % Lymphocytes 43.3 %    % Monocytes 12.6 %    % Eosinophils 0.6 %    % Basophils 0.6 %    Absolute Neutrophil 1.5 (L) 1.6 - 8.3 10e9/L    Absolute Lymphocytes 1.5 0.8 - 5.3 10e9/L    Absolute Monocytes 0.4 0.0 - 1.3 10e9/L    Absolute Eosinophils 0.0 0.0 - 0.7 10e9/L    Absolute Basophils 0.0 0.0 - 0.2 10e9/L    Diff Method Automated Method            Assessment & Plan     1. Sialoadenitis of submandibular gland  Pt with tenderness of the left parotid and left submandibular gland for the past month.  She has had 1 course of clindamycin with some mild improvement of sx. She has been afebrile through out.   No purulence or erythema on exam.   No palpable cervical nodes.   White count is not elevated.   notable hx of prior right parotitis   Can try lemon candies again, good hydration. Encouraged smoking cessation.   Advised ENT consult due to persisting sx.     - OTOLARYNGOLOGY REFERRAL    2. Lymphadenopathy  - CBC with platelets and differential  - OTOLARYNGOLOGY REFERRAL     BMI:   Estimated body mass index is 26.45 kg/m  as calculated from  "the following:    Height as of this encounter: 1.59 m (5' 2.6\").    Weight as of this encounter: 66.9 kg (147 lb 6.4 oz).           Follow Up: The patient was instructed to contact clinic for worsening symptoms, non-improvement as expected/discussed, and for questions regarding medications or treatment plan. Discussed parameters for follow up and included in After Visit Summary given to patient.      Return in about 1 month (around 7/30/2020) for with ENT as soon as able in next week or so.    Jovana Hodges PA-C  [facility]    "

## 2020-10-27 ENCOUNTER — E-VISIT (OUTPATIENT)
Dept: FAMILY MEDICINE | Facility: CLINIC | Age: 30
End: 2020-10-27

## 2020-10-27 DIAGNOSIS — M54.50 ACUTE LEFT-SIDED LOW BACK PAIN WITHOUT SCIATICA: ICD-10-CM
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
## 2020-10-27 PROCEDURE — 99421 OL DIG E/M SVC 5-10 MIN: CPT | Performed by: PHYSICIAN ASSISTANT

## 2020-10-28 NOTE — TELEPHONE ENCOUNTER
"LM #1on pt cell to gather more information- ~2:20pm.  LM #2 on pt cell 5:08pm.     Called pt-  10/29/2020 11:34am  Slipped on ice getting out of car- Monday 2am (after work). Fell from half-standing position to ground. Landed on buttocks.   Left side of back is \"tight\", shooting pain back into left leg.   No N/T. More pain. No weakness in the leg that is new or diff - \"there is always weakness on that side my knee is bad\".   No bruising   No bowel or bladder changes.   She is not worried about fracture.   Taking NSAIDs- ibuprofen 600mg or 2 aleve.     1. Fall due to slipping on ice or snow, initial encounter  2. Acute left-sided low back pain without sciatica  Ice, heat  Max dosing nsaids discussed.   Muscle relaxer as below- try to use only at bedtime, but ok daytime if not driving and resting. No alcohol w/the medication.   Follow up if not gradually improving as expected  - cyclobenzaprine (FLEXERIL) 10 MG tablet; Take 1 tablet (10 mg) by mouth 3 times daily as needed for muscle spasms  Dispense: 20 tablet; Refill: 0      Provider E-Visit time total (minutes): 7 minutes   "

## 2020-10-29 RX ORDER — CYCLOBENZAPRINE HCL 10 MG
10 TABLET ORAL 3 TIMES DAILY PRN
Qty: 20 TABLET | Refills: 0 | Status: SHIPPED | OUTPATIENT
Start: 2020-10-29 | End: 2023-06-17

## 2020-12-14 ENCOUNTER — HEALTH MAINTENANCE LETTER (OUTPATIENT)
Age: 30
End: 2020-12-14

## 2021-04-17 ENCOUNTER — HEALTH MAINTENANCE LETTER (OUTPATIENT)
Age: 31
End: 2021-04-17

## 2021-05-24 ENCOUNTER — TELEPHONE (OUTPATIENT)
Dept: FAMILY MEDICINE | Facility: OTHER | Age: 31
End: 2021-05-24

## 2021-05-24 NOTE — TELEPHONE ENCOUNTER
Patient Quality Outreach Summary      Summary:    Patient is due/failing the following:   Cervical Cancer Screening - PAP Needed and Physical     Type of outreach:    patient scheduled   Next 5 appointments (look out 90 days)    Jun 02, 2021  2:30 PM  Office Visit with Maame Hudson MD  [facility] ([facility] [place] ) [address] [address]  [phone]          Questions for provider review:    None                                                                                                                    Chana Moran CMA       Chart routed to Care Team.

## 2021-06-01 NOTE — PROGRESS NOTES
SUBJECTIVE:   CC: Audrey Snell is an 30 year old woman who presents for preventive health visit.     Patient has been advised of split billing requirements and indicates understanding: Yes  Healthy Habits:     Getting at least 3 servings of Calcium per day:  Yes    Bi-annual eye exam:  Yes    Dental care twice a year:  NO    Sleep apnea or symptoms of sleep apnea:  None    Diet:  Regular (no restrictions)    Frequency of exercise:  None    Taking medications regularly:  Yes    Medication side effects:  None    PHQ-2 Total Score: 2    Additional concerns today:  Yes (e/o month she is getting extremely sick before period. a girl at work has the same sx and wants to talk about endometriosis)    We discussed her nausea and cramping with menstrual cycles though has had irregularity in her periods and not always symptomatic with menses she believes she has noticed patterns to this effect during times of stress and is interested in becoming pregnant in the next year or so.    Today's PHQ-2 Score:   PHQ-2 ( 1999 [employer]) 6/2/2021   Q1: Little interest or pleasure in doing things 1   Q2: Feeling down, depressed or hopeless 1   PHQ-2 Score 2   Q1: Little interest or pleasure in doing things Several days   Q2: Feeling down, depressed or hopeless Several days   PHQ-2 Score 2       Abuse: Current or Past (Physical, Sexual or Emotional) - No  Do you feel safe in your environment? Yes    Have you ever done Advance Care Planning? (For example, a Health Directive, POLST, or a discussion with a medical provider or your loved ones about your wishes): No, advance care planning information given to patient to review.  Patient declined advance care planning discussion at this time.    Social History     Tobacco Use     Smoking status: Current Every Day Smoker     Packs/day: 1.00     Years: 3.50     Pack years: 3.50     Types: Cigarettes     Smokeless tobacco: Never Used     Tobacco comment: 1 ppd   Substance Use Topics     Alcohol  "use: Yes       Alcohol Use 6/2/2021   Prescreen: >3 drinks/day or >7 drinks/week? No   Reviewed orders with patient.  Reviewed health maintenance and updated orders accordingly - Yes  Lab work is in process    Breast Cancer Screening:  Any new diagnosis of family breast, ovarian, or bowel cancer? No    FSH-7: No flowsheet data found.    Patient under 40 years of age: Routine Mammogram Screening not recommended.   Pertinent mammograms are reviewed under the imaging tab.    History of abnormal Pap smear: NO - age 30-65 PAP every 5 years with negative HPV co-testing recommended  PAP / HPV 1/9/2012   PAP NIL     Reviewed and updated as needed this visit by clinical staff  Tobacco  Allergies  Meds  Problems  Med Hx  Surg Hx  Fam Hx  Soc Hx          Reviewed and updated as needed this visit by Provider  Tobacco  Allergies  Meds  Problems  Med Hx  Surg Hx  Fam Hx           Review of Systems  CONSTITUTIONAL: NEGATIVE for fever, chills, change in weight  INTEGUMENTARU/SKIN: NEGATIVE for worrisome rashes, moles or lesions  EYES: NEGATIVE for vision changes or irritation  ENT: NEGATIVE for ear, mouth and throat problems  RESP: NEGATIVE for significant cough or SOB  BREAST: NEGATIVE for masses, tenderness or discharge  CV: NEGATIVE for chest pain, palpitations or peripheral edema  GI: NEGATIVE for nausea, abdominal pain, heartburn, or change in bowel habits  : NEGATIVE for unusual urinary or vaginal symptoms. Periods are regular.  MUSCULOSKELETAL: NEGATIVE for significant arthralgias or myalgia  NEURO: NEGATIVE for weakness, dizziness or paresthesias  PSYCHIATRIC: NEGATIVE for changes in mood or affect     OBJECTIVE:   /76   Pulse 88   Temp 99.2  F (37.3  C) (Temporal)   Resp 16   Ht 1.605 m (5' 3.19\")   Wt 70.3 kg (155 lb)   LMP 05/10/2021   SpO2 100%   BMI 27.29 kg/m    Physical Exam  GENERAL: healthy, alert and no distress  EYES: Eyes grossly normal to inspection, PERRL and conjunctivae and " sclerae normal  HENT: ear canals and TM's normal, nose and mouth without ulcers or lesions  NECK: no adenopathy, no asymmetry, masses, or scars and thyroid normal to palpation  RESP: lungs clear to auscultation - no rales, rhonchi or wheezes  BREAST: normal without masses, tenderness or nipple discharge and no palpable axillary masses or adenopathy  CV: regular rate and rhythm, normal S1 S2, no S3 or S4, no murmur, click or rub, no peripheral edema and peripheral pulses strong  ABDOMEN: soft, nontender, no hepatosplenomegaly, no masses and bowel sounds normal   (female): normal female external genitalia, normal urethral meatus, vaginal mucosa pink, moist, well rugated, and normal cervix/adnexa/uterus without masses or discharge  MS: no gross musculoskeletal defects noted, no edema  SKIN: no suspicious lesions or rashes  NEURO: Normal strength and tone, mentation intact and speech normal  PSYCH: mentation appears normal, affect normal/bright        ASSESSMENT/PLAN:       ICD-10-CM    1. Routine general medical examination at a health care facility  Z00.00    2. Screening for hyperlipidemia  Z13.220    3. Screening for malignant neoplasm of cervix  Z12.4 Pap imaged thin layer screen with HPV - recommended age 30 - 65 years (select HPV order below)     HPV High Risk Types DNA Cervical     Patient is noted to have sometimes symptomatic heavy menses.  Though she never has more than a pad an hour and more than 3 days of periods.  She has had a few episodes of nausea and vomiting just before her menstrual cycle starting and had gone to the ER previously for this in which case they stated her cannabinoids may have contributed though she is stop this and has had recurrent since.  Still encouraged to continue to stay off of marijuana and tobacco though it is possible that her menstrual cycles may be inducing this at times. treatment would include hormonal treatment versus symptomatic treatment.  Discussed today antinausea  "medications like Zofran versus birth control pills and she is unsure what decision she would like to do today.  She does know she would not like an IUD or Nexplanon because she is planning a pregnancy in the near future.  In addition to preventative visit, 25 min spent on the date of the encounter in chart review, patient visit, review of tests, documentation and/or discussion with other providers about the issues documented above.       Patient has been advised of split billing requirements and indicates understanding: Yes  COUNSELING:  Reviewed preventive health counseling, as reflected in patient instructions       Regular exercise       Healthy diet/nutrition    Estimated body mass index is 27.29 kg/m  as calculated from the following:    Height as of this encounter: 1.605 m (5' 3.19\").    Weight as of this encounter: 70.3 kg (155 lb).    Weight management plan: Discussed healthy diet and exercise guidelines    She reports that she has been smoking cigarettes. She has a 3.50 pack-year smoking history. She has never used smokeless tobacco.  Tobacco Cessation Action Plan:   Information offered: Patient not interested at this time      Counseling Resources:  ATP IV Guidelines  Pooled Cohorts Equation Calculator  Breast Cancer Risk Calculator  BRCA-Related Cancer Risk Assessment: FHS-7 Tool  FRAX Risk Assessment  ICSI Preventive Guidelines  Dietary Guidelines for Americans, 2010  [employer] MyPlate  ASA Prophylaxis  Lung CA Screening    Maame Hudson MD, MD  [facility]  Answers for HPI/ROS submitted by the patient on 6/2/2021   Annual Exam:  If you checked off any problems, how difficult have these problems made it for you to do your work, take care of things at home, or get along with other people?: Somewhat difficult  PHQ9 TOTAL SCORE: 10  VERONICA 7 TOTAL SCORE: 18    "

## 2021-06-02 ENCOUNTER — OFFICE VISIT (OUTPATIENT)
Dept: FAMILY MEDICINE | Facility: OTHER | Age: 31
End: 2021-06-02

## 2021-06-02 VITALS
OXYGEN SATURATION: 100 % | SYSTOLIC BLOOD PRESSURE: 124 MMHG | RESPIRATION RATE: 16 BRPM | BODY MASS INDEX: 27.46 KG/M2 | TEMPERATURE: 99.2 F | HEART RATE: 88 BPM | HEIGHT: 63 IN | DIASTOLIC BLOOD PRESSURE: 76 MMHG | WEIGHT: 155 LBS

## 2021-06-02 DIAGNOSIS — Z12.4 SCREENING FOR MALIGNANT NEOPLASM OF CERVIX: ICD-10-CM

## 2021-06-02 DIAGNOSIS — Z13.220 SCREENING FOR HYPERLIPIDEMIA: ICD-10-CM

## 2021-06-02 DIAGNOSIS — Z00.00 ROUTINE GENERAL MEDICAL EXAMINATION AT A HEALTH CARE FACILITY: Primary | ICD-10-CM

## 2021-06-02 DIAGNOSIS — N92.0 MENORRHAGIA WITH REGULAR CYCLE: ICD-10-CM

## 2021-06-02 PROCEDURE — 99395 PREV VISIT EST AGE 18-39: CPT | Performed by: FAMILY MEDICINE

## 2021-06-02 PROCEDURE — 99213 OFFICE O/P EST LOW 20 MIN: CPT | Mod: 25 | Performed by: FAMILY MEDICINE

## 2021-06-02 PROCEDURE — 87624 HPV HI-RISK TYP POOLED RSLT: CPT | Performed by: FAMILY MEDICINE

## 2021-06-02 PROCEDURE — G0145 SCR C/V CYTO,THINLAYER,RESCR: HCPCS | Performed by: FAMILY MEDICINE

## 2021-06-02 ASSESSMENT — ANXIETY QUESTIONNAIRES
7. FEELING AFRAID AS IF SOMETHING AWFUL MIGHT HAPPEN: NEARLY EVERY DAY
6. BECOMING EASILY ANNOYED OR IRRITABLE: NEARLY EVERY DAY
4. TROUBLE RELAXING: MORE THAN HALF THE DAYS
7. FEELING AFRAID AS IF SOMETHING AWFUL MIGHT HAPPEN: NEARLY EVERY DAY
1. FEELING NERVOUS, ANXIOUS, OR ON EDGE: NEARLY EVERY DAY
2. NOT BEING ABLE TO STOP OR CONTROL WORRYING: NEARLY EVERY DAY
5. BEING SO RESTLESS THAT IT IS HARD TO SIT STILL: SEVERAL DAYS
GAD7 TOTAL SCORE: 18
3. WORRYING TOO MUCH ABOUT DIFFERENT THINGS: NEARLY EVERY DAY

## 2021-06-02 ASSESSMENT — ENCOUNTER SYMPTOMS
SHORTNESS OF BREATH: 0
ABDOMINAL PAIN: 0
FEVER: 0
WEAKNESS: 1
HEMATURIA: 0
NERVOUS/ANXIOUS: 1
HEMATOCHEZIA: 0
CHILLS: 0
FREQUENCY: 0
NAUSEA: 0
DIZZINESS: 0
JOINT SWELLING: 1
SORE THROAT: 0
HEADACHES: 0
DYSURIA: 0
BREAST MASS: 0
PARESTHESIAS: 0
EYE PAIN: 0
HEARTBURN: 0
DIARRHEA: 0
COUGH: 0
ARTHRALGIAS: 1
PALPITATIONS: 0
CONSTIPATION: 0
MYALGIAS: 0

## 2021-06-02 ASSESSMENT — PATIENT HEALTH QUESTIONNAIRE - PHQ9
SUM OF ALL RESPONSES TO PHQ QUESTIONS 1-9: 10
10. IF YOU CHECKED OFF ANY PROBLEMS, HOW DIFFICULT HAVE THESE PROBLEMS MADE IT FOR YOU TO DO YOUR WORK, TAKE CARE OF THINGS AT HOME, OR GET ALONG WITH OTHER PEOPLE: SOMEWHAT DIFFICULT
SUM OF ALL RESPONSES TO PHQ QUESTIONS 1-9: 10

## 2021-06-02 ASSESSMENT — PAIN SCALES - GENERAL: PAINLEVEL: NO PAIN (0)

## 2021-06-02 ASSESSMENT — MIFFLIN-ST. JEOR: SCORE: 1395.21

## 2021-06-03 ASSESSMENT — PATIENT HEALTH QUESTIONNAIRE - PHQ9: SUM OF ALL RESPONSES TO PHQ QUESTIONS 1-9: 10

## 2021-06-03 ASSESSMENT — ANXIETY QUESTIONNAIRES: GAD7 TOTAL SCORE: 18

## 2021-06-04 LAB
COPATH REPORT: NORMAL
PAP: NORMAL

## 2021-06-10 LAB
FINAL DIAGNOSIS: NORMAL
HPV HR 12 DNA CVX QL NAA+PROBE: NEGATIVE
HPV16 DNA SPEC QL NAA+PROBE: NEGATIVE
HPV18 DNA SPEC QL NAA+PROBE: NEGATIVE
SPECIMEN DESCRIPTION: NORMAL
SPECIMEN SOURCE CVX/VAG CYTO: NORMAL

## 2021-10-02 ENCOUNTER — HEALTH MAINTENANCE LETTER (OUTPATIENT)
Age: 31
End: 2021-10-02

## 2022-04-01 ENCOUNTER — APPOINTMENT (OUTPATIENT)
Dept: ULTRASOUND IMAGING | Facility: CLINIC | Age: 32
End: 2022-04-01
Attending: NURSE PRACTITIONER

## 2022-04-01 ENCOUNTER — HOSPITAL ENCOUNTER (EMERGENCY)
Facility: CLINIC | Age: 32
Discharge: HOME | End: 2022-04-01
Attending: NURSE PRACTITIONER | Admitting: NURSE PRACTITIONER

## 2022-04-01 VITALS
TEMPERATURE: 98.6 F | HEART RATE: 52 BPM | RESPIRATION RATE: 14 BRPM | OXYGEN SATURATION: 97 % | DIASTOLIC BLOOD PRESSURE: 79 MMHG | SYSTOLIC BLOOD PRESSURE: 119 MMHG

## 2022-04-01 DIAGNOSIS — R11.15 CYCLICAL VOMITING SYNDROME: ICD-10-CM

## 2022-04-01 DIAGNOSIS — R74.8 ELEVATED LIPASE: ICD-10-CM

## 2022-04-01 LAB
ALBUMIN SERPL-MCNC: 4.4 G/DL (ref 3.4–5)
ALP SERPL-CCNC: 53 U/L (ref 40–150)
ALT SERPL W P-5'-P-CCNC: 22 U/L (ref 0–50)
ANION GAP SERPL CALCULATED.3IONS-SCNC: 10 MMOL/L (ref 3–14)
AST SERPL W P-5'-P-CCNC: 26 U/L (ref 0–45)
BASOPHILS # BLD AUTO: 0 10E3/UL (ref 0–0.2)
BASOPHILS NFR BLD AUTO: 0 %
BILIRUB SERPL-MCNC: 2 MG/DL (ref 0.2–1.3)
BUN SERPL-MCNC: 14 MG/DL (ref 7–30)
CALCIUM SERPL-MCNC: 9.7 MG/DL (ref 8.5–10.1)
CHLORIDE BLD-SCNC: 102 MMOL/L (ref 94–109)
CO2 SERPL-SCNC: 23 MMOL/L (ref 20–32)
CREAT SERPL-MCNC: 0.57 MG/DL (ref 0.52–1.04)
EOSINOPHIL # BLD AUTO: 0 10E3/UL (ref 0–0.7)
EOSINOPHIL NFR BLD AUTO: 0 %
ERYTHROCYTE [DISTWIDTH] IN BLOOD BY AUTOMATED COUNT: 11.8 % (ref 10–15)
GFR SERPL CREATININE-BSD FRML MDRD: >90 ML/MIN/1.73M2
GLUCOSE BLD-MCNC: 113 MG/DL (ref 70–99)
HCT VFR BLD AUTO: 41.8 % (ref 35–47)
HGB BLD-MCNC: 14.8 G/DL (ref 11.7–15.7)
IMM GRANULOCYTES # BLD: 0 10E3/UL
IMM GRANULOCYTES NFR BLD: 0 %
LIPASE SERPL-CCNC: 872 U/L (ref 73–393)
LYMPHOCYTES # BLD AUTO: 0.8 10E3/UL (ref 0.8–5.3)
LYMPHOCYTES NFR BLD AUTO: 8 %
MCH RBC QN AUTO: 30.9 PG (ref 26.5–33)
MCHC RBC AUTO-ENTMCNC: 35.4 G/DL (ref 31.5–36.5)
MCV RBC AUTO: 87 FL (ref 78–100)
MONOCYTES # BLD AUTO: 0.3 10E3/UL (ref 0–1.3)
MONOCYTES NFR BLD AUTO: 4 %
NEUTROPHILS # BLD AUTO: 8.2 10E3/UL (ref 1.6–8.3)
NEUTROPHILS NFR BLD AUTO: 88 %
NRBC # BLD AUTO: 0 10E3/UL
NRBC BLD AUTO-RTO: 0 /100
PLATELET # BLD AUTO: 298 10E3/UL (ref 150–450)
POTASSIUM BLD-SCNC: 4.1 MMOL/L (ref 3.4–5.3)
PROT SERPL-MCNC: 7.9 G/DL (ref 6.8–8.8)
RBC # BLD AUTO: 4.79 10E6/UL (ref 3.8–5.2)
SODIUM SERPL-SCNC: 135 MMOL/L (ref 133–144)
TRIGL SERPL-MCNC: 68 MG/DL
TROPONIN I SERPL HS-MCNC: <3 NG/L
WBC # BLD AUTO: 9.4 10E3/UL (ref 4–11)

## 2022-04-01 PROCEDURE — 83690 ASSAY OF LIPASE: CPT | Performed by: NURSE PRACTITIONER

## 2022-04-01 PROCEDURE — 84484 ASSAY OF TROPONIN QUANT: CPT | Performed by: NURSE PRACTITIONER

## 2022-04-01 PROCEDURE — 96375 TX/PRO/DX INJ NEW DRUG ADDON: CPT | Performed by: NURSE PRACTITIONER

## 2022-04-01 PROCEDURE — 250N000011 HC RX IP 250 OP 636: Performed by: NURSE PRACTITIONER

## 2022-04-01 PROCEDURE — 84478 ASSAY OF TRIGLYCERIDES: CPT | Performed by: NURSE PRACTITIONER

## 2022-04-01 PROCEDURE — 93010 ELECTROCARDIOGRAM REPORT: CPT | Performed by: NURSE PRACTITIONER

## 2022-04-01 PROCEDURE — 99285 EMERGENCY DEPT VISIT HI MDM: CPT | Mod: 25 | Performed by: NURSE PRACTITIONER

## 2022-04-01 PROCEDURE — 96374 THER/PROPH/DIAG INJ IV PUSH: CPT | Performed by: NURSE PRACTITIONER

## 2022-04-01 PROCEDURE — 258N000003 HC RX IP 258 OP 636: Performed by: NURSE PRACTITIONER

## 2022-04-01 PROCEDURE — 93005 ELECTROCARDIOGRAM TRACING: CPT | Performed by: NURSE PRACTITIONER

## 2022-04-01 PROCEDURE — 96376 TX/PRO/DX INJ SAME DRUG ADON: CPT | Performed by: NURSE PRACTITIONER

## 2022-04-01 PROCEDURE — 80053 COMPREHEN METABOLIC PANEL: CPT | Performed by: NURSE PRACTITIONER

## 2022-04-01 PROCEDURE — 36415 COLL VENOUS BLD VENIPUNCTURE: CPT | Performed by: NURSE PRACTITIONER

## 2022-04-01 PROCEDURE — 96361 HYDRATE IV INFUSION ADD-ON: CPT | Performed by: NURSE PRACTITIONER

## 2022-04-01 PROCEDURE — 85025 COMPLETE CBC W/AUTO DIFF WBC: CPT | Performed by: NURSE PRACTITIONER

## 2022-04-01 PROCEDURE — 76705 ECHO EXAM OF ABDOMEN: CPT

## 2022-04-01 RX ORDER — ONDANSETRON 2 MG/ML
4 INJECTION INTRAMUSCULAR; INTRAVENOUS ONCE
Status: COMPLETED | OUTPATIENT
Start: 2022-04-01 | End: 2022-04-01

## 2022-04-01 RX ORDER — SODIUM CHLORIDE 9 MG/ML
INJECTION, SOLUTION INTRAVENOUS CONTINUOUS
Status: DISCONTINUED | OUTPATIENT
Start: 2022-04-01 | End: 2022-04-01 | Stop reason: HOSPADM

## 2022-04-01 RX ORDER — KETOROLAC TROMETHAMINE 15 MG/ML
15 INJECTION, SOLUTION INTRAMUSCULAR; INTRAVENOUS ONCE
Status: COMPLETED | OUTPATIENT
Start: 2022-04-01 | End: 2022-04-01

## 2022-04-01 RX ORDER — ONDANSETRON 4 MG/1
4 TABLET, ORALLY DISINTEGRATING ORAL EVERY 8 HOURS PRN
Qty: 10 TABLET | Refills: 0 | Status: SHIPPED | OUTPATIENT
Start: 2022-04-01 | End: 2022-04-04

## 2022-04-01 RX ORDER — LORAZEPAM 2 MG/ML
0.5 INJECTION INTRAMUSCULAR ONCE
Status: COMPLETED | OUTPATIENT
Start: 2022-04-01 | End: 2022-04-01

## 2022-04-01 RX ADMIN — SODIUM CHLORIDE 1000 ML: 9 INJECTION, SOLUTION INTRAVENOUS at 13:50

## 2022-04-01 RX ADMIN — ONDANSETRON 4 MG: 2 INJECTION INTRAMUSCULAR; INTRAVENOUS at 15:09

## 2022-04-01 RX ADMIN — LORAZEPAM 0.5 MG: 2 INJECTION INTRAMUSCULAR; INTRAVENOUS at 13:55

## 2022-04-01 RX ADMIN — ONDANSETRON 4 MG: 2 INJECTION INTRAMUSCULAR; INTRAVENOUS at 13:58

## 2022-04-01 RX ADMIN — KETOROLAC TROMETHAMINE 15 MG: 15 INJECTION, SOLUTION INTRAMUSCULAR; INTRAVENOUS at 13:56

## 2022-04-01 NOTE — DISCHARGE INSTRUCTIONS
Zofran 4 mg every 6 hours as needed for nausea.  Make appointment to see Dr. Hudson in the next week for recheck. I recommend repeat check of your Lipase.  I have sent referral for gastroenterology. (contact information attached). [phone].  Stop using marijuana.  Return for fevers, return of vomiting-unable to keep fluids down, or any new symptoms of concern.

## 2022-04-01 NOTE — ED TRIAGE NOTES
Presents with nausea, vomiting for the past few days. States her heart feels like it's racing. She reports this has happened in the past.

## 2022-04-01 NOTE — ED NOTES
IV started - blood drawn with IV start and sent to lab. IV fluids infusing, meds administered. Call light in reach. VS monitoring. Pt is resting, warm blanket provided.

## 2022-04-01 NOTE — ED PROVIDER NOTES
History     Chief Complaint   Patient presents with     Chest Pain     Nausea & Vomiting     HPI  Audrey Snell is a 31 year old female who presents for evaluation of nausea, vomiting, and chest pain.  Symptoms started 2 days ago with nausea and vomiting.  She has been unable to keep any fluids down.  Today she felt like her heart was racing and reports left upper chest pain.  Denies diarrhea or constipation.  No bloody emesis.  No black or bloody stool.  Denies fever or chills.  Denies cough or congestion.Patient has had similar episodes like this in the past.  She has been told that she has cannabis hyperemesis syndrome.  Patient tells me she stopped using marijuana for 4 months but it still occurred.  The only relief she gets is taking hot baths and showers.  Denies drinking alcohol.  Last marijuana use was 3 days ago.    Allergies:  No Known Allergies    Problem List:    Patient Active Problem List    Diagnosis Date Noted     Iliotibial band syndrome, left knee 05/19/2016     Priority: Medium     Irritable bowel syndrome with diarrhea 03/03/2016     Priority: Medium     Migraine 06/22/2015     Priority: Medium     Problem list name updated by automated process. 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 No family hx of      Diabetes No family hx of      Cerebrovascular Disease No family hx of      Breast Cancer No family hx of      Prostate Cancer No family hx of      Alcohol/Drug No family hx of      Allergies No family hx of      Alzheimer Disease No family hx of      Anesthesia Reaction No family hx of      Arthritis No family hx of      Blood Disease No family hx of      Cardiovascular No family hx of      Circulatory No family hx of      Congenital Anomalies No family hx of      Connective Tissue Disorder No family hx of      Depression No family hx of      Endocrine Disease No family hx of      Eye Disorder No family hx of      Genetic Disorder No family hx of      Gastrointestinal Disease No family hx of      Genitourinary Problems No family hx of      Gynecology No family hx of      Heart Disease No family hx of      Lipids No family hx of      Musculoskeletal Disorder No family hx of      Neurologic Disorder No family hx of      Obesity No family hx of      Osteoporosis No family hx of      Psychotic Disorder No family hx of      Respiratory No family hx of      Thyroid Disease No family hx of      Hearing Loss No family hx of        Social History:  Marital Status:  Single [1]  Social History     Tobacco Use     Smoking status: Current Every Day Smoker     Packs/day: 1.00     Years: 3.50     Pack years: 3.50     Types: Cigarettes     Smokeless tobacco: Never Used     Tobacco comment: 1 ppd   Substance Use Topics     Alcohol use: Not Currently     Drug use: Yes     Comment: marijuana occasionally        Medications:    ondansetron (ZOFRAN ODT) 4 MG ODT tab  Cetirizine HCl (ZYRTEC ALLERGY  PO)  cyclobenzaprine (FLEXERIL) 10 MG tablet  Glucosamine-Chondroitin (MOVE FREE PO)  IBUPROFEN PO  Multiple Vitamins-Minerals (MULTIVITAMIN PO)  Pseudoephedrine HCl (SUDAFED PO)  Saccharomyces boulardii (PROBIOTIC) 250 MG CAPS          Review of Systems  As mentioned above in the history present illness. All other systems were reviewed and are negative.    Physical Exam   BP: (!) 142/86  Pulse: 83  Temp: 98.6  F (37  C)  Resp: 14  SpO2: 98 %      Physical Exam  Constitutional:       General: She is not in acute distress.     Appearance: Normal appearance. She is well-developed. She is not ill-appearing.   HENT:      Head: Normocephalic and atraumatic.      Right Ear: External ear normal.      Left Ear: External ear normal.      Nose: Nose normal.      Mouth/Throat:      Mouth: Mucous membranes are moist.   Eyes:      Conjunctiva/sclera: Conjunctivae normal.   Cardiovascular:      Rate and Rhythm: Normal rate and regular rhythm.      Heart sounds: Normal heart sounds. No murmur heard.  Pulmonary:      Effort: Pulmonary effort is normal. No respiratory distress.      Breath sounds: Normal breath sounds.   Chest:      Chest wall: Tenderness (left upper chest) present. No swelling or crepitus.       Abdominal:      General: Bowel sounds are normal. There is no distension.      Palpations: Abdomen is soft.      Tenderness: There is generalized abdominal tenderness. Negative signs include Paul's sign.   Musculoskeletal:         General: Normal range of motion.   Skin:     General: Skin is warm and dry.      Findings: No rash.   Neurological:      General: No focal deficit present.      Mental Status: She is alert and oriented to person, place, and time.         ED Course                 Procedures       EKG Interpretation:      Interpreted by SHIRLENE Kirkpatrick CNP  Time reviewed:1403   Symptoms at time of EKG: chest pain   Rhythm: Normal sinus   Rate: Normal  Axis: Normal  Ectopy: None  Conduction: Normal  ST  Segments/ T Waves: No ST-T wave changes and No acute ischemic changes  Q Waves: None  Comparison to prior: No old EKG available    Clinical Impression: normal EKG                Results for orders placed or performed during the hospital encounter of 04/01/22 (from the past 24 hour(s))   CBC with platelets differential    Narrative    The following orders were created for panel order CBC with platelets differential.  Procedure                               Abnormality         Status                     ---------                               -----------         ------                     CBC with platelets and d...[[id number]]                      Final result                 Please view results for these tests on the individual orders.   Comprehensive metabolic panel   Result Value Ref Range    Sodium 135 133 - 144 mmol/L    Potassium 4.1 3.4 - 5.3 mmol/L    Chloride 102 94 - 109 mmol/L    Carbon Dioxide (CO2) 23 20 - 32 mmol/L    Anion Gap 10 3 - 14 mmol/L    Urea Nitrogen 14 7 - 30 mg/dL    Creatinine 0.57 0.52 - 1.04 mg/dL    Calcium 9.7 8.5 - 10.1 mg/dL    Glucose 113 (H) 70 - 99 mg/dL    Alkaline Phosphatase 53 40 - 150 U/L    AST 26 0 - 45 U/L    ALT 22 0 - 50 U/L    Protein Total 7.9 6.8 - 8.8 g/dL    Albumin 4.4 3.4 - 5.0 g/dL    Bilirubin Total 2.0 (H) 0.2 - 1.3 mg/dL    GFR Estimate >90 >60 mL/min/1.73m2   Lipase   Result Value Ref Range    Lipase 872 (H) 73 - 393 U/L   Troponin I   Result Value Ref Range    Troponin I High Sensitivity <3 <54 ng/L   CBC with platelets and differential   Result Value Ref Range    WBC Count 9.4 4.0 - 11.0 10e3/uL    RBC Count 4.79 3.80 - 5.20 10e6/uL    Hemoglobin 14.8 11.7 - 15.7 g/dL    Hematocrit 41.8 35.0 - 47.0 %    MCV 87 78 - 100 fL    MCH 30.9 26.5 - 33.0 pg    MCHC 35.4 31.5 - 36.5 g/dL    RDW 11.8 10.0 - 15.0 %    Platelet Count 298 150 - 450 10e3/uL    % Neutrophils 88 %    % Lymphocytes 8 %    % Monocytes 4 %    % Eosinophils 0 %    % Basophils 0 %    % Immature  Granulocytes 0 %    NRBCs per 100 WBC 0 <1 /100    Absolute Neutrophils 8.2 1.6 - 8.3 10e3/uL    Absolute Lymphocytes 0.8 0.8 - 5.3 10e3/uL    Absolute Monocytes 0.3 0.0 - 1.3 10e3/uL    Absolute Eosinophils 0.0 0.0 - 0.7 10e3/uL    Absolute Basophils 0.0 0.0 - 0.2 10e3/uL    Absolute Immature Granulocytes 0.0 <=0.4 10e3/uL    Absolute NRBCs 0.0 10e3/uL   Triglycerides   Result Value Ref Range    Triglycerides 68 <150 mg/dL   US Abdomen Limited (RUQ)    Narrative    US ABDOMEN LIMITED 4/1/2022 4:02 PM    CLINICAL HISTORY: Nausea and vomiting. Elevated lipase and bilirubin.    TECHNIQUE: Limited abdominal ultrasound.    COMPARISON: None.    FINDINGS:    GALLBLADDER: The gallbladder is normal. No gallstones, wall  thickening, or pericholecystic fluid. Negative sonographic Paul's  sign.    BILE DUCTS: There is no biliary dilatation. The common duct measures  4mm.    LIVER: No focal lesion. Potential mild fatty liver.    RIGHT KIDNEY: No hydronephrosis.    PANCREAS: The visualized portions of the pancreas are normal.    No ascites.      Impression    IMPRESSION:  1.  No gallstones. Negative sonographic Paul's sign. No biliary  ductal dilatation.  2.  Mild fatty liver.    YUMI RAINES MD         SYSTEM ID:  [id number]       Medications   0.9% sodium chloride BOLUS (1,000 mLs Intravenous New Bag 4/1/22 1350)     Followed by   sodium chloride 0.9% infusion (has no administration in time range)   ondansetron (ZOFRAN) injection 4 mg (4 mg Intravenous Given 4/1/22 1358)   LORazepam (ATIVAN) injection 0.5 mg (0.5 mg Intravenous Given 4/1/22 1355)   ketorolac (TORADOL) injection 15 mg (15 mg Intravenous Given 4/1/22 1356)   ondansetron (ZOFRAN) injection 4 mg (4 mg Intravenous Given 4/1/22 1509)       Assessments & Plan (with Medical Decision Making)     Patient has no vomiting here after IV Ativan, IV toradol and IV Zofran x2.   She was given IV NS 1 liter bolus.  Normal CBC.  Elevated Lipase of 872. Normal AST and ALT.  T.bili elevated at 2.0.  Given the lab findings we checked triglycerides which are normal and US RUQ is negative for gallstones or obstructing stone. There is no biliary dilation.    Patient does not present classic for pancreatitis. No focal abdominal tenderness and does not radiate to her back. Denies drinking alcohol after asking her several times.  She is not a diabetic.  I discussed with patient getting a abdominal CT to further evaluate her pancreas or close follow-up next week in clinic.  She can have her lipase repeated at that time.    Her presentation and the fact that the only relief she gets is using hot baths is very consistent with cannabis hyperemesis syndrome.  Patient is concerned that her cyclical vomiting is being caused by something different than her cannabis use.  I informed her that it can take up to 1 year of stopping marijuana use before cannabis hyperemesis syndrome resolves.  Patient would like a referral for gastroenterology which is reasonable given her elevated lipase and episodes of cyclical vomiting.    Plan as follows:  Zofran 4 mg every 6 hours as needed for nausea.  Make appointment to see Dr. Hudson in the next week for recheck. I recommend repeat check of your Lipase.  I have sent referral for gastroenterology. (contact information attached). [phone].  Stop using marijuana.  Return for fevers, return of vomiting-unable to keep fluids down, or any new symptoms of concern.    New Prescriptions    ONDANSETRON (ZOFRAN ODT) 4 MG ODT TAB    Take 1 tablet (4 mg) by mouth every 8 hours as needed for nausea       Final diagnoses:   Cyclical vomiting syndrome       4/1/2022   [facility] EMERGENCY DEPT     Jose, SHIRLENE Mukherjee CNP  04/01/22 9571

## 2022-04-04 ENCOUNTER — TELEPHONE (OUTPATIENT)
Dept: GASTROENTEROLOGY | Facility: CLINIC | Age: 32
End: 2022-04-04

## 2022-04-04 NOTE — TELEPHONE ENCOUNTER
[facility] Call Center    Phone Message    May a detailed message be left on voicemail: yes     Reason for Call: Other: Patient is being referred for vomiting and has lost 10lbs within 3 days. Please review per scheduling guidelines. Thanks!     Action Taken: Message routed to:  [facility] GI    Travel Screening: Not Applicable

## 2022-05-18 ENCOUNTER — HOSPITAL ENCOUNTER (EMERGENCY)
Facility: CLINIC | Age: 32
Discharge: HOME | End: 2022-05-18
Attending: EMERGENCY MEDICINE | Admitting: EMERGENCY MEDICINE

## 2022-05-18 VITALS
HEART RATE: 73 BPM | OXYGEN SATURATION: 99 % | SYSTOLIC BLOOD PRESSURE: 110 MMHG | WEIGHT: 149 LBS | RESPIRATION RATE: 16 BRPM | DIASTOLIC BLOOD PRESSURE: 74 MMHG | BODY MASS INDEX: 26.24 KG/M2 | TEMPERATURE: 98.8 F

## 2022-05-18 DIAGNOSIS — R17 ELEVATED BILIRUBIN: ICD-10-CM

## 2022-05-18 DIAGNOSIS — G43.909 MIGRAINE WITHOUT STATUS MIGRAINOSUS, NOT INTRACTABLE, UNSPECIFIED MIGRAINE TYPE: ICD-10-CM

## 2022-05-18 LAB
ALBUMIN SERPL-MCNC: 4.8 G/DL (ref 3.4–5)
ALP SERPL-CCNC: 57 U/L (ref 40–150)
ALT SERPL W P-5'-P-CCNC: 21 U/L (ref 0–50)
ANION GAP SERPL CALCULATED.3IONS-SCNC: 8 MMOL/L (ref 3–14)
AST SERPL W P-5'-P-CCNC: 19 U/L (ref 0–45)
BASOPHILS # BLD MANUAL: 0 10E3/UL (ref 0–0.2)
BASOPHILS NFR BLD MANUAL: 0 %
BILIRUB DIRECT SERPL-MCNC: 0.3 MG/DL (ref 0–0.2)
BILIRUB SERPL-MCNC: 2.5 MG/DL (ref 0.2–1.3)
BILIRUB SERPL-MCNC: 2.5 MG/DL (ref 0.2–1.3)
BUN SERPL-MCNC: 14 MG/DL (ref 7–30)
CALCIUM SERPL-MCNC: 9.8 MG/DL (ref 8.5–10.1)
CHLORIDE BLD-SCNC: 97 MMOL/L (ref 94–109)
CO2 SERPL-SCNC: 29 MMOL/L (ref 20–32)
CREAT SERPL-MCNC: 0.66 MG/DL (ref 0.52–1.04)
EOSINOPHIL # BLD MANUAL: 0.1 10E3/UL (ref 0–0.7)
EOSINOPHIL NFR BLD MANUAL: 2 %
ERYTHROCYTE [DISTWIDTH] IN BLOOD BY AUTOMATED COUNT: 11.9 % (ref 10–15)
GFR SERPL CREATININE-BSD FRML MDRD: >90 ML/MIN/1.73M2
GLUCOSE BLD-MCNC: 140 MG/DL (ref 70–99)
HCT VFR BLD AUTO: 43.6 % (ref 35–47)
HGB BLD-MCNC: 16 G/DL (ref 11.7–15.7)
HOLD SPECIMEN: NORMAL
HOLD SPECIMEN: NORMAL
LIPASE SERPL-CCNC: 61 U/L (ref 73–393)
LYMPHOCYTES # BLD MANUAL: 1.4 10E3/UL (ref 0.8–5.3)
LYMPHOCYTES NFR BLD MANUAL: 20 %
MCH RBC QN AUTO: 31.7 PG (ref 26.5–33)
MCHC RBC AUTO-ENTMCNC: 36.7 G/DL (ref 31.5–36.5)
MCV RBC AUTO: 86 FL (ref 78–100)
MONOCYTES # BLD MANUAL: 0.6 10E3/UL (ref 0–1.3)
MONOCYTES NFR BLD MANUAL: 9 %
NEUTROPHILS # BLD MANUAL: 4.8 10E3/UL (ref 1.6–8.3)
NEUTROPHILS NFR BLD MANUAL: 69 %
PLAT MORPH BLD: NORMAL
PLATELET # BLD AUTO: 318 10E3/UL (ref 150–450)
POTASSIUM BLD-SCNC: 3.1 MMOL/L (ref 3.4–5.3)
PROT SERPL-MCNC: 7.9 G/DL (ref 6.8–8.8)
RBC # BLD AUTO: 5.05 10E6/UL (ref 3.8–5.2)
RBC MORPH BLD: NORMAL
SODIUM SERPL-SCNC: 134 MMOL/L (ref 133–144)
WBC # BLD AUTO: 7 10E3/UL (ref 4–11)

## 2022-05-18 PROCEDURE — 96366 THER/PROPH/DIAG IV INF ADDON: CPT | Performed by: EMERGENCY MEDICINE

## 2022-05-18 PROCEDURE — 82248 BILIRUBIN DIRECT: CPT | Performed by: EMERGENCY MEDICINE

## 2022-05-18 PROCEDURE — 99284 EMERGENCY DEPT VISIT MOD MDM: CPT | Performed by: EMERGENCY MEDICINE

## 2022-05-18 PROCEDURE — 85007 BL SMEAR W/DIFF WBC COUNT: CPT | Performed by: EMERGENCY MEDICINE

## 2022-05-18 PROCEDURE — 250N000011 HC RX IP 250 OP 636: Performed by: EMERGENCY MEDICINE

## 2022-05-18 PROCEDURE — 258N000003 HC RX IP 258 OP 636: Performed by: EMERGENCY MEDICINE

## 2022-05-18 PROCEDURE — 82040 ASSAY OF SERUM ALBUMIN: CPT | Performed by: EMERGENCY MEDICINE

## 2022-05-18 PROCEDURE — 96361 HYDRATE IV INFUSION ADD-ON: CPT | Performed by: EMERGENCY MEDICINE

## 2022-05-18 PROCEDURE — 99284 EMERGENCY DEPT VISIT MOD MDM: CPT | Mod: 25 | Performed by: EMERGENCY MEDICINE

## 2022-05-18 PROCEDURE — 85014 HEMATOCRIT: CPT | Performed by: EMERGENCY MEDICINE

## 2022-05-18 PROCEDURE — 96375 TX/PRO/DX INJ NEW DRUG ADDON: CPT | Performed by: EMERGENCY MEDICINE

## 2022-05-18 PROCEDURE — 36415 COLL VENOUS BLD VENIPUNCTURE: CPT | Performed by: EMERGENCY MEDICINE

## 2022-05-18 PROCEDURE — 80053 COMPREHEN METABOLIC PANEL: CPT | Performed by: EMERGENCY MEDICINE

## 2022-05-18 PROCEDURE — 96365 THER/PROPH/DIAG IV INF INIT: CPT | Performed by: EMERGENCY MEDICINE

## 2022-05-18 PROCEDURE — 83690 ASSAY OF LIPASE: CPT | Performed by: EMERGENCY MEDICINE

## 2022-05-18 RX ORDER — DIPHENHYDRAMINE HYDROCHLORIDE 50 MG/ML
25 INJECTION INTRAMUSCULAR; INTRAVENOUS ONCE
Status: COMPLETED | OUTPATIENT
Start: 2022-05-18 | End: 2022-05-18

## 2022-05-18 RX ORDER — DEXAMETHASONE SODIUM PHOSPHATE 10 MG/ML
10 INJECTION, SOLUTION INTRAMUSCULAR; INTRAVENOUS ONCE
Status: COMPLETED | OUTPATIENT
Start: 2022-05-18 | End: 2022-05-18

## 2022-05-18 RX ORDER — SODIUM CHLORIDE 9 MG/ML
INJECTION, SOLUTION INTRAVENOUS CONTINUOUS
Status: DISCONTINUED | OUTPATIENT
Start: 2022-05-18 | End: 2022-05-18 | Stop reason: HOSPADM

## 2022-05-18 RX ORDER — ONDANSETRON 2 MG/ML
4 INJECTION INTRAMUSCULAR; INTRAVENOUS
Status: COMPLETED | OUTPATIENT
Start: 2022-05-18 | End: 2022-05-18

## 2022-05-18 RX ORDER — NOREPINEPHRINE BITARTRATE 0.02 MG/ML
.01-.6 INJECTION, SOLUTION INTRAVENOUS CONTINUOUS
Status: DISCONTINUED | OUTPATIENT
Start: 2022-05-18 | End: 2022-05-18

## 2022-05-18 RX ORDER — MAGNESIUM SULFATE 1 G/100ML
1 INJECTION INTRAVENOUS ONCE
Status: COMPLETED | OUTPATIENT
Start: 2022-05-18 | End: 2022-05-18

## 2022-05-18 RX ORDER — KETOROLAC TROMETHAMINE 15 MG/ML
15 INJECTION, SOLUTION INTRAMUSCULAR; INTRAVENOUS ONCE
Status: COMPLETED | OUTPATIENT
Start: 2022-05-18 | End: 2022-05-18

## 2022-05-18 RX ADMIN — DIPHENHYDRAMINE HYDROCHLORIDE 25 MG: 50 INJECTION, SOLUTION INTRAMUSCULAR; INTRAVENOUS at 12:43

## 2022-05-18 RX ADMIN — SODIUM CHLORIDE: 9 INJECTION, SOLUTION INTRAVENOUS at 12:51

## 2022-05-18 RX ADMIN — ONDANSETRON 4 MG: 2 INJECTION INTRAMUSCULAR; INTRAVENOUS at 10:58

## 2022-05-18 RX ADMIN — MAGNESIUM SULFATE HEPTAHYDRATE 1 G: 1 INJECTION, SOLUTION INTRAVENOUS at 12:36

## 2022-05-18 RX ADMIN — KETOROLAC TROMETHAMINE 15 MG: 15 INJECTION, SOLUTION INTRAMUSCULAR; INTRAVENOUS at 12:44

## 2022-05-18 RX ADMIN — SODIUM CHLORIDE 500 ML: 9 INJECTION, SOLUTION INTRAVENOUS at 10:57

## 2022-05-18 RX ADMIN — DEXAMETHASONE SODIUM PHOSPHATE 10 MG: 10 INJECTION, SOLUTION INTRAMUSCULAR; INTRAVENOUS at 12:49

## 2022-05-18 RX ADMIN — PROCHLORPERAZINE EDISYLATE 10 MG: 5 INJECTION INTRAMUSCULAR; INTRAVENOUS at 12:46

## 2022-05-18 NOTE — DISCHARGE INSTRUCTIONS
-When he did home today try to rest and stay well-hydrated the headache does not rebound.    -Would recommend calling your primary care doctor or accessing [facility] and leaving an email to have them review your bilirubin levels both today and in the past.  This will help determine if you should be seen by a hematologist which is a GI doctor who specializes with liver.

## 2022-05-18 NOTE — ED PROVIDER NOTES
History     Chief Complaint   Patient presents with     Headache     HPI     Audrey Snell is a 31 year old female significant past medical's for migraine headache, tobacco use disorder, mixed mood disorder with both anxiety and moderate major depression.  Presents with a long week history for headache.  Bioccipital.  Intensifies at night where she rates it 8/10 intensity.  In the daytime is usually a 4-5/2010 intensity.  No associated fever chills or night sweats.  No nuchal rigidity.  Migraine headaches since 7-8 years ago.  Not related to menses.  No previous closed head injury or TBI.  Positive family history on father side of the family for migraine headache.  Has not missed any menses/not read about any current pregnancy.  Continued dry heaves over the last week.  Concerned about dehydration.  Patient also requesting blood work to check liver and pancreas.  She has had elevated lab values in the past.    Allergies:  No Known Allergies    Problem List:    Patient Active Problem List    Diagnosis Date Noted     Iliotibial band syndrome, left knee 05/19/2016     Priority: Medium     Irritable bowel syndrome with diarrhea 03/03/2016     Priority: Medium     Migraine 06/22/2015     Priority: Medium     Problem list name updated by automated process. 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 No family hx of      Diabetes No family hx of      Cerebrovascular Disease No family hx of      Breast Cancer No family hx of      Prostate Cancer No family hx of      Alcohol/Drug No family hx of      Allergies No family hx of      Alzheimer Disease No family hx of      Anesthesia Reaction No family hx of      Arthritis No family hx of      Blood Disease No family hx of      Cardiovascular No family hx of      Circulatory No family hx of      Congenital Anomalies No family hx of      Connective Tissue Disorder No family hx of      Depression No family hx of      Endocrine Disease No family hx of      Eye Disorder No family hx of      Genetic Disorder No family hx of      Gastrointestinal Disease No family hx of      Genitourinary Problems No family hx of      Gynecology No family hx of      Heart Disease No family hx of      Lipids No family hx of      Musculoskeletal Disorder No family hx of      Neurologic Disorder No family hx of      Obesity No family hx of      Osteoporosis No family hx of      Psychotic Disorder No family hx of      Respiratory No family hx of      Thyroid Disease No family hx of      Hearing Loss No family hx of        Social History:  Marital Status:  Single [1]  Social History     Tobacco Use     Smoking status: Current Every Day Smoker     Packs/day: 1.00     Years: 3.50     Pack years: 3.50     Types: Cigarettes     Smokeless tobacco: Never Used     Tobacco comment: 1 ppd   Substance Use Topics     Alcohol use: Not Currently     Drug use: Yes     Comment: marijuana occasionally        Medications:     Cetirizine HCl (ZYRTEC ALLERGY PO)  cyclobenzaprine (FLEXERIL) 10 MG tablet  Glucosamine-Chondroitin (MOVE FREE PO)  IBUPROFEN PO  Multiple Vitamins-Minerals (MULTIVITAMIN PO)  Pseudoephedrine HCl (SUDAFED PO)  Saccharomyces boulardii (PROBIOTIC) 250 MG CAPS          Review of Systems   All other systems reviewed and are negative.      Physical Exam   BP: (!) 133/91  Pulse: 105  Temp: 98.8  F (37.1  C)  Resp: 16  Weight: 67.6 kg (149 lb)  SpO2: 99 %      Physical Exam  Vitals and nursing note reviewed.   Constitutional:       Appearance: She is not ill-appearing.   HENT:      Head: Normocephalic.      Nose: Nose normal.   Eyes:      Conjunctiva/sclera: Conjunctivae normal.   Cardiovascular:      Rate and Rhythm: Normal rate.   Pulmonary:      Effort: Pulmonary effort is normal.   Skin:     General: Skin is warm.      Capillary Refill: Capillary refill takes less than 2 seconds.      Findings: No rash.   Neurological:      General: No focal deficit present.      Mental Status: She is alert and oriented to person, place, and time.      Motor: Motor function is intact.      Coordination: Coordination is intact.      Gait: Gait is intact.      Deep Tendon Reflexes: Reflexes are normal and symmetric.      Comments: Photophobia present  Headache currently 4/10 intensity   Psychiatric:         Mood and Affect: Mood normal.         Behavior: Behavior normal.         ED Course                 Procedures                  Results for orders placed or performed during the hospital encounter of 05/18/22 (from the past 24 hour(s))   [place] Draw *Canceled*    Narrative    The following orders were created for panel order [place] Draw.  Procedure                               Abnormality         Status                     ---------                               -----------         ------                       Please view results for these tests on the individual orders.   [place] Draw    Narrative    The following orders were  created for panel order [place] Draw.  Procedure                               Abnormality         Status                     ---------                               -----------         ------                     Extra Green Top (Lithium...[[id number]]                      In process                 Extra Purple Top Tube[[id number]]                            In process                   Please view results for these tests on the individual orders.       Medications   0.9% sodium chloride BOLUS (has no administration in time range)     Followed by   sodium chloride 0.9% infusion (has no administration in time range)   ketorolac (TORADOL) injection 15 mg (has no administration in time range)   prochlorperazine (COMPAZINE) injection 10 mg (has no administration in time range)   diphenhydrAMINE (BENADRYL) injection 25 mg (has no administration in time range)   magnesium sulfate 1 gm in 100mL D5W intermittent infusion (has no administration in time range)   dexamethasone PF (DECADRON) injection 10 mg (has no administration in time range)   ondansetron (ZOFRAN) injection 4 mg (4 mg Intravenous Given 5/18/22 1058)   0.9% sodium chloride BOLUS (500 mLs Intravenous New Bag 5/18/22 1057)       Assessments & Plan (with Medical Decision Making)  31-year-old female.  Complaining of occipital headache that is bioccipital.  Not associate with fever chills or night sweats.  Present for the last 7 days.  Has had recurrence of dry heaves.  Concerned about dehydration.  History for similar headaches/migraine headache dating back 7-8 years ago.  No previous closed head injury/concussion.  Menses related.  Examination:  /91.  Afebrile.  Pulse 105 and a sinus tach.  O2 sats 90% room air.  Pupils are equal at 6 mm in a very DM room.  Cranials 2 through 12 intact.  No motor or sensory deficits.  No pathologic reflexes.  Abdomen soft nontender no hepatomegaly.  Spleen tip not palpable.  Plan: Proceed with migraine nonnarcotic headache  protocol.  Check CBC, CMP and lipase.  Headache resolved.  Informed patient that she still has an elevated bilirubin with other LFTs and lipase/alkaline phosphatase normal.  With a prior history for elevated bilirubin I did send off fractionated bilirubin and referred patient back to primary care provider to see if they would like to refer her onto hepatologist.       I have reviewed the nursing notes.    I have reviewed the findings, diagnosis, plan and need for follow up with the patient.      New Prescriptions    No medications on file       Final diagnoses:   Migraine without status migrainosus, not intractable, unspecified migraine type   Elevated bilirubin       5/18/2022   [facility] EMERGENCY DEPT     Alan Brody,   05/18/22 6217

## 2022-05-18 NOTE — ED TRIAGE NOTES
Patient c/o headache and vomiting x1 week.      Triage Assessment     Row Name 05/18/22 1035       Triage Assessment (Adult)    Airway WDL WDL

## 2022-05-19 ENCOUNTER — PATIENT OUTREACH (OUTPATIENT)
Dept: FAMILY MEDICINE | Facility: OTHER | Age: 32
End: 2022-05-19

## 2022-05-19 NOTE — TELEPHONE ENCOUNTER
Reason for follow up call: Audrey Snell appeared on our list for being seen in and recenlty discharge from the Emergency Room/In Patient Hospital Admission.    Chief Complaint   Patient presents with     Hospital F/U     39% yellow      Migraine without status migrainosus, not intractable      TCM Episode NO    Encounter routed for Clinic Triage RN to call for follow up

## 2022-05-19 NOTE — RESULT ENCOUNTER NOTE
"Per Emergency Dept Provider comment, \"...With a prior history for elevated bilirubin I did send off fractionated bilirubin and referred patient back to primary care provider to see if they would like to refer her onto hepatologist.\"    Routed result to PCP, Dr Hudson"

## 2022-05-23 NOTE — TELEPHONE ENCOUNTER
"ED/Discharge Protocol    \"Hi, my name is Caitlyn Snider RN, a registered nurse, and I am calling on behalf of Dr. Hudson's office at [place].  I am calling to follow up and see how things are going for you after your recent visit.\"    \"I see that you were in the (ER/UC/IP) on 5/17.    How are you doing now that you are home?\" Much better, using her normal migraine medications and focusing on hydration.     Is patient experiencing symptoms that may require a hospital visit?  No    Discharge Instructions    \"Let's review your discharge instructions.  What is/are the follow-up recommendations?  Pt. Response: Follow up for elevated labs     \"Were you instructed to make a follow-up appointment?\"  Pt. Response: Yes.  Has appointment been made?   Yes, has an appointment scheduled with GI for cyclic vomiting and elevated Bili. Declined visit with PCP     \"When you see the provider, I would recommend that you bring your discharge instructions with you.    Medications    \"How many new medications are you on since your hospitalization/ED visit?\"    0-1  \"How many of your current medicines changed (dose, timing, name, etc.) while you were in the hospital/ED visit?\"   0-1  \"Do you have questions about your medications?\"   No  \"Were you newly diagnosed with heart failure, COPD, diabetes or did you have a heart attack?\"   No  For patients on insulin: \"Did you start on insulin in the hospital or did you have your insulin dose changed?\"   No  Post Discharge Medication Reconciliation Status: patient was not discharged from an inpatient facility.    Was MTM referral placed (*Make sure to put transitions as reason for referral)?   No    Call Summary    \"Do you have any questions or concerns about your condition or care plan at the moment?\"    No  Triage nurse advice given: N/A    Patient was in ER 2 in the past year (assess appropriateness of ER visits.)      \"If you have questions or things don't continue to improve, we encourage " "you contact us through the main clinic number,  [phone].  Even if the clinic is not open, triage nurses are available 24/7 to help you.     We would like you to know that our clinic has extended hours (provide information).  We also have urgent care (provide details on closest location and hours/contact info)\"      \"Thank you for your time and take care!\"        "

## 2022-07-03 ENCOUNTER — HEALTH MAINTENANCE LETTER (OUTPATIENT)
Age: 32
End: 2022-07-03

## 2023-01-14 ENCOUNTER — HEALTH MAINTENANCE LETTER (OUTPATIENT)
Age: 33
End: 2023-01-14

## 2023-03-15 ENCOUNTER — HOSPITAL ENCOUNTER (EMERGENCY)
Facility: CLINIC | Age: 33
Discharge: HOME | End: 2023-03-15

## 2023-03-15 VITALS
HEART RATE: 87 BPM | DIASTOLIC BLOOD PRESSURE: 106 MMHG | OXYGEN SATURATION: 99 % | TEMPERATURE: 97.2 F | SYSTOLIC BLOOD PRESSURE: 126 MMHG | RESPIRATION RATE: 20 BRPM

## 2023-03-16 NOTE — ED TRIAGE NOTES
Patient presents after having n/v consistently today. Patient is complaining of shortness of breath, some mid-sternal chest pain that comes and goes. Patient is fidgeting and moaning during triage.     Triage Assessment     Row Name 03/15/23 1955       Triage Assessment (Adult)    Airway WDL WDL       Respiratory WDL    Respiratory WDL X;rhythm/pattern    Rhythm/Pattern, Respiratory shortness of breath       Cardiac WDL    Cardiac WDL X;chest pain       Chest Pain Assessment    Chest Pain Location midsternal

## 2023-06-17 ENCOUNTER — HOSPITAL ENCOUNTER (EMERGENCY)
Facility: CLINIC | Age: 33
Discharge: HOME | End: 2023-06-17
Attending: EMERGENCY MEDICINE | Admitting: EMERGENCY MEDICINE

## 2023-06-17 ENCOUNTER — APPOINTMENT (OUTPATIENT)
Dept: ULTRASOUND IMAGING | Facility: CLINIC | Age: 33
End: 2023-06-17
Attending: EMERGENCY MEDICINE

## 2023-06-17 VITALS
BODY MASS INDEX: 29.61 KG/M2 | TEMPERATURE: 98.9 F | WEIGHT: 160.9 LBS | HEART RATE: 61 BPM | SYSTOLIC BLOOD PRESSURE: 124 MMHG | HEIGHT: 62 IN | OXYGEN SATURATION: 99 % | DIASTOLIC BLOOD PRESSURE: 70 MMHG | RESPIRATION RATE: 18 BRPM

## 2023-06-17 DIAGNOSIS — R11.0 NAUSEA: ICD-10-CM

## 2023-06-17 DIAGNOSIS — R22.31 SKIN LUMP OF ARM, RIGHT: ICD-10-CM

## 2023-06-17 DIAGNOSIS — N63.20 MASS OF LEFT BREAST, UNSPECIFIED QUADRANT: ICD-10-CM

## 2023-06-17 DIAGNOSIS — F41.9 ANXIETY: ICD-10-CM

## 2023-06-17 LAB
ALBUMIN SERPL BCG-MCNC: 4.7 G/DL (ref 3.5–5.2)
ALP SERPL-CCNC: 54 U/L (ref 35–104)
ALT SERPL W P-5'-P-CCNC: 16 U/L (ref 0–50)
ANION GAP SERPL CALCULATED.3IONS-SCNC: 10 MMOL/L (ref 7–15)
AST SERPL W P-5'-P-CCNC: 12 U/L (ref 0–45)
BASOPHILS # BLD AUTO: 0 10E3/UL (ref 0–0.2)
BASOPHILS NFR BLD AUTO: 1 %
BILIRUB SERPL-MCNC: 1.3 MG/DL
BUN SERPL-MCNC: 8.2 MG/DL (ref 6–20)
CALCIUM SERPL-MCNC: 9.3 MG/DL (ref 8.6–10)
CHLORIDE SERPL-SCNC: 104 MMOL/L (ref 98–107)
CREAT SERPL-MCNC: 0.62 MG/DL (ref 0.51–0.95)
DEPRECATED HCO3 PLAS-SCNC: 23 MMOL/L (ref 22–29)
EOSINOPHIL # BLD AUTO: 0 10E3/UL (ref 0–0.7)
EOSINOPHIL NFR BLD AUTO: 0 %
ERYTHROCYTE [DISTWIDTH] IN BLOOD BY AUTOMATED COUNT: 12.5 % (ref 10–15)
GFR SERPL CREATININE-BSD FRML MDRD: >90 ML/MIN/1.73M2
GLUCOSE SERPL-MCNC: 105 MG/DL (ref 70–99)
HCT VFR BLD AUTO: 42 % (ref 35–47)
HGB BLD-MCNC: 14.3 G/DL (ref 11.7–15.7)
IMM GRANULOCYTES # BLD: 0 10E3/UL
IMM GRANULOCYTES NFR BLD: 0 %
LYMPHOCYTES # BLD AUTO: 1.2 10E3/UL (ref 0.8–5.3)
LYMPHOCYTES NFR BLD AUTO: 15 %
MCH RBC QN AUTO: 30.4 PG (ref 26.5–33)
MCHC RBC AUTO-ENTMCNC: 34 G/DL (ref 31.5–36.5)
MCV RBC AUTO: 89 FL (ref 78–100)
MONOCYTES # BLD AUTO: 0.4 10E3/UL (ref 0–1.3)
MONOCYTES NFR BLD AUTO: 5 %
NEUTROPHILS # BLD AUTO: 6.2 10E3/UL (ref 1.6–8.3)
NEUTROPHILS NFR BLD AUTO: 79 %
NRBC # BLD AUTO: 0 10E3/UL
NRBC BLD AUTO-RTO: 0 /100
PLATELET # BLD AUTO: 299 10E3/UL (ref 150–450)
POTASSIUM SERPL-SCNC: 3.8 MMOL/L (ref 3.4–5.3)
PROT SERPL-MCNC: 7.1 G/DL (ref 6.4–8.3)
RBC # BLD AUTO: 4.71 10E6/UL (ref 3.8–5.2)
SODIUM SERPL-SCNC: 137 MMOL/L (ref 136–145)
WBC # BLD AUTO: 7.7 10E3/UL (ref 4–11)

## 2023-06-17 PROCEDURE — 76882 US LMTD JT/FCL EVL NVASC XTR: CPT | Mod: RT

## 2023-06-17 PROCEDURE — 90791 PSYCH DIAGNOSTIC EVALUATION: CPT

## 2023-06-17 PROCEDURE — 96361 HYDRATE IV INFUSION ADD-ON: CPT | Performed by: EMERGENCY MEDICINE

## 2023-06-17 PROCEDURE — 99284 EMERGENCY DEPT VISIT MOD MDM: CPT | Performed by: EMERGENCY MEDICINE

## 2023-06-17 PROCEDURE — 99285 EMERGENCY DEPT VISIT HI MDM: CPT | Mod: 25 | Performed by: EMERGENCY MEDICINE

## 2023-06-17 PROCEDURE — 36415 COLL VENOUS BLD VENIPUNCTURE: CPT | Performed by: EMERGENCY MEDICINE

## 2023-06-17 PROCEDURE — 85025 COMPLETE CBC W/AUTO DIFF WBC: CPT | Performed by: EMERGENCY MEDICINE

## 2023-06-17 PROCEDURE — 258N000003 HC RX IP 258 OP 636: Performed by: EMERGENCY MEDICINE

## 2023-06-17 PROCEDURE — 250N000011 HC RX IP 250 OP 636: Performed by: EMERGENCY MEDICINE

## 2023-06-17 PROCEDURE — 96374 THER/PROPH/DIAG INJ IV PUSH: CPT | Performed by: EMERGENCY MEDICINE

## 2023-06-17 PROCEDURE — 96376 TX/PRO/DX INJ SAME DRUG ADON: CPT | Performed by: EMERGENCY MEDICINE

## 2023-06-17 PROCEDURE — 80053 COMPREHEN METABOLIC PANEL: CPT | Performed by: EMERGENCY MEDICINE

## 2023-06-17 PROCEDURE — 96375 TX/PRO/DX INJ NEW DRUG ADDON: CPT | Performed by: EMERGENCY MEDICINE

## 2023-06-17 RX ORDER — ONDANSETRON 2 MG/ML
4 INJECTION INTRAMUSCULAR; INTRAVENOUS EVERY 30 MIN PRN
Status: DISCONTINUED | OUTPATIENT
Start: 2023-06-17 | End: 2023-06-17 | Stop reason: HOSPADM

## 2023-06-17 RX ORDER — LORAZEPAM 2 MG/ML
1 INJECTION INTRAMUSCULAR ONCE
Status: COMPLETED | OUTPATIENT
Start: 2023-06-17 | End: 2023-06-17

## 2023-06-17 RX ORDER — CETIRIZINE HYDROCHLORIDE 10 MG/1
10 TABLET ORAL DAILY PRN
COMMUNITY

## 2023-06-17 RX ORDER — LORAZEPAM 1 MG/1
1 TABLET ORAL 2 TIMES DAILY PRN
Qty: 10 TABLET | Refills: 0 | Status: SHIPPED | OUTPATIENT
Start: 2023-06-17

## 2023-06-17 RX ORDER — ONDANSETRON 2 MG/ML
4 INJECTION INTRAMUSCULAR; INTRAVENOUS ONCE
Status: COMPLETED | OUTPATIENT
Start: 2023-06-17 | End: 2023-06-17

## 2023-06-17 RX ORDER — LORAZEPAM 2 MG/ML
0.5 INJECTION INTRAMUSCULAR ONCE
Status: COMPLETED | OUTPATIENT
Start: 2023-06-17 | End: 2023-06-17

## 2023-06-17 RX ADMIN — ONDANSETRON 4 MG: 2 INJECTION INTRAMUSCULAR; INTRAVENOUS at 12:02

## 2023-06-17 RX ADMIN — ONDANSETRON 4 MG: 2 INJECTION INTRAMUSCULAR; INTRAVENOUS at 13:46

## 2023-06-17 RX ADMIN — SODIUM CHLORIDE 1000 ML: 9 INJECTION, SOLUTION INTRAVENOUS at 11:53

## 2023-06-17 RX ADMIN — LORAZEPAM 1 MG: 2 INJECTION INTRAMUSCULAR; INTRAVENOUS at 13:46

## 2023-06-17 RX ADMIN — LORAZEPAM 0.5 MG: 2 INJECTION INTRAMUSCULAR; INTRAVENOUS at 12:06

## 2023-06-17 ASSESSMENT — COLUMBIA-SUICIDE SEVERITY RATING SCALE - C-SSRS
TOTAL  NUMBER OF INTERRUPTED ATTEMPTS LIFETIME: NO
TOTAL  NUMBER OF ABORTED OR SELF INTERRUPTED ATTEMPTS LIFETIME: NO
ATTEMPT LIFETIME: NO
6. HAVE YOU EVER DONE ANYTHING, STARTED TO DO ANYTHING, OR PREPARED TO DO ANYTHING TO END YOUR LIFE?: NO
2. HAVE YOU ACTUALLY HAD ANY THOUGHTS OF KILLING YOURSELF?: NO
1. HAVE YOU WISHED YOU WERE DEAD OR WISHED YOU COULD GO TO SLEEP AND NOT WAKE UP?: NO

## 2023-06-17 ASSESSMENT — ACTIVITIES OF DAILY LIVING (ADL)
ADLS_ACUITY_SCORE: 35

## 2023-06-17 NOTE — MEDICATION SCRIBE - ADMISSION MEDICATION HISTORY
Medication Scribe Admission Medication History    Admission medication history is complete. The information provided in this note is only as accurate as the sources available at the time of the update.    Medication reconciliation/reorder completed by provider prior to medication history? No    Information Source(s): Patient via in-person    Pertinent Information: n/a    Changes made to PTA medication list:    Added: None    Deleted: flexeril, glucosamine/chondriotin, ibuprofen, sudafed, probiotic    Changed: None    Medication Affordability:  Not including over the counter (OTC) medications, was there a time in the past 3 months when you did not take your medications as prescribed because of cost?: No    Allergies reviewed with patient and updates made in EHR: yes    Medication History Completed By: MARIA DE JESUS STOLL 6/17/2023 1:22 PM    Prior to Admission medications    Medication Sig Last Dose Taking? Auth Provider Long Term End Date   cetirizine (ZYRTEC) 10 MG tablet Take 10 mg by mouth daily as needed for allergies 6/15/2023 at noon Yes Reported, Patient     Multiple Vitamins-Minerals (MULTIVITAMIN PO) Take 1 tablet by mouth daily (with lunch) 6/15/2023 at noon Yes Reported, Patient

## 2023-06-17 NOTE — ED NOTES
"Pt was standing in room dry heaving and crying when RN entered room. She states that she became anxious and needed to get up. She denies nausea, but reports that she \"gets in her head\" and starts vomiting. Pt endorsed vomiting every morning for \"years\" and monthly \"episodes of vomiting\" where she calls [facility] and they give her fluids, nausea medication, and benadryl for her to sleep. She denies ever having any mental health services or being on any mental health medications for anxiety or depression. She expressed saddness over missing many family events due to anxiety and vomiting. She also states family and boss have expressed frustration with her, telling her that she can control it.   "

## 2023-06-17 NOTE — DISCHARGE INSTRUCTIONS
Mental Health Aftercare Plan    Date: Tuesday, 6/20/2023  Time: 12:00 pm - 1:00 pm  Provider: Marisa EVNTURA PA-C  Location: [facility], [address] [place] [address], [address], [place] [address]  Phone: [phone]  Type: Telepsychiatry    Please fill New Patient Form by using following link. All forms need to be completed 24 hours prior to the appointment date/time by going to [email or web address] Please call us on [phone] 24 hours prior to your scheduled appointment to confirm that you are able to attend. We will provide you information about how to log into video call software when you call.    Anxiety Reducing technique, I encourage you to give it a try.   This technique will take you through your five senses to help remind you of the present. This is a calming technique that can help you get through tough or stressful situations.      5 - LOOK: Look around for 5 things that you can see, and say them out loud. For example, you could say, I see the computer, I see the cup, I see the picture frame.     4 - FEEL: Pay attention to your body and think of 4 things that you can feel, and say them out loud. For example, you could say, I feel my feet warm in my socks, I feel the hair on the back of my neck, or I feel the pillow I am sitting on.     3 - LISTEN: Listen for 3 sounds. It could be the sound of traffic outside, the sound of typing or the sound of your tummy rumbling. Say the three things out loud.     2 - SMELL: Say two things you can smell. If you re allowed to, it s okay to move to another spot and sniff something. If you can t smell anything at the moment or you can t move, then name your 2 favorite smells.     1 - TASTE: Say one thing you can taste. It may be the toothpaste from brushing your teeth, or a mint from after lunch. If you can t taste anything, then say your favorite thing to taste.       *National Suicide Prevention Lifeline is an around the clock support,  dial 869

## 2023-06-17 NOTE — CONSULTS
Diagnostic Evaluation Consultation  Crisis Assessment    Patient was assessed: I-Pad  Patient location: [facility] ED  Was a release of information signed: yes     Referral Data and Chief Complaint  Audrey is a 33 year old, who uses she/her pronouns, and presents to the ED alone. Patient is referred to the ED by self. Patient is presenting to the ED for the following concerns: anxiety.      Informed Consent and Assessment Methods     Patient is her own guardian. Writer met with patient and explained the crisis assessment process, including applicable information disclosures and limits to confidentiality, assessed understanding of the process, and obtained consent to proceed with the assessment. Patient was observed to be able to participate in the assessment as evidenced by answering of questions. Assessment methods included conducting a formal interview with patient, review of medical records, collaboration with medical staff, and obtaining relevant collateral information from family and community providers when available..     Over the course of this crisis assessment provided reassurance, offered validation, engaged patient in problem solving and disposition planning and worked with patient on brief, therapeutic activity: anxiety reducers. Patient's response to interventions was appreciative.     Summary of Patient Situation     Pt self presents with report that she woke up vomiting. She reports to experience episodes every month where she will vomit for 24 hours.     This started in 2014 around pt menstrual cycle then occurred prior to big events, now the sx can be generalized to anything.     No hx of formal anxiety dx. PCP did prescribe an antianxiety medication in her past though pt denies this was helpful.     Brief Psychosocial History     Lives alone. Has a dog. 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     Significant Clinical History     No formal MH dx.      Collateral Information    Review of epic.       Risk Assessment  [place] Suicide Severity Rating Scale Full Clinical Version: 6/17/23  Suicidal Ideation  1. Wish to be Dead (Lifetime): No  2. Non-Specific Active Suicidal Thoughts (Lifetime): No     Suicidal Behavior  Actual Attempt (Lifetime): No  Has subject engaged in non-suicidal self-injurious behavior? (Lifetime): No  Interrupted Attempts (Lifetime): No  Aborted or Self-Interrupted Attempt (Lifetime): No  Preparatory Acts or Behavior (Lifetime): No  C-SSRS Risk (Lifetime/Recent)  Calculated C-SSRS Risk Score (Lifetime/Recent): No Risk Indicated    Validity of evaluation is not impacted by presenting factors during interview.   Comments regarding subjective versus objective responses to Lycoming tool: none  Environmental or Psychosocial Events: none  Chronic Risk Factors: none  Warning Signs: anxiety, agitation, unable to sleep, sleeping all the time  Protective Factors: strong bond to family unit, community support, or employment, responsibilities and duties to others, including pets and children, lives in a responsibly safe and stable environment, able to access care without barriers, help seeking and reality testing ability  Interpretation of Risk Scoring, Risk Mitigation Interventions and Safety Plan: low risk    Does the patient have thoughts of harming others? No     Is the patient engaging in sexually inappropriate behavior?  no        Current Substance Abuse     Is there recent substance abuse? no     Was a urine drug screen or blood alcohol level obtained: No       Mental Status Exam     Affect: Other: tearful   Appearance: Appropriate    Attention Span/Concentration: Attentive  Eye Contact: Engaged   Fund of Knowledge: Appropriate    Language /Speech Content: Fluent   Language /Speech Volume: Normal    Language /Speech Rate/Productions: Normal    Recent Memory: Intact   Remote Memory: Intact   Mood: Anxious    Orientation to Person: Yes    Orientation to Place: Yes   Orientation to Time of Day:  Yes    Orientation to Date: Yes    Situation (Do they understand why they are here?): Yes    Psychomotor Behavior: Normal    Thought Content: Clear   Thought Form: Intact      History of commitment: No    Medication    Psychotropic medications: No  Medication changes made in the last two weeks: No       Current Care Team    Primary Care Provider: Maame Hudson MD  Psychiatrist: No  Therapist: No  : No     CTSS or ARMHS: No  ACT Team: No  Other: No      Diagnosis    300.00 (F41.9) Unspecified Anxiety Disorder     Clinical Summary and Substantiation of Recommendations    No acute safety concerns. Pt desires to restart psychiatric medication - appt made through [facility] - scheduled for next week. If she desires therapy in the future, pt states she is aware of how to access resources in her community. Pt to discharge to home.  Disposition    Recommended disposition: Medication Management       Reviewed case and recommendations with attending provider. Attending Name: Andre CASTRO      Attending concurs with disposition: Yes       Patient and/or validated legal guardian concurs with disposition: Yes       Final disposition: Medication management.     Outpatient Details (if applicable):   Aftercare plan and appointments placed in the AVS and provided to patient: Yes. Given to patient by ED staff    Was lethal means counseling provided as a part of aftercare planning? No;       Assessment Details    Patient interview started at: 402p and completed at: 420p.     Total duration spent on the patient case in minutes: 1.50 hrs      CPT code(s) utilized: 93817 - Psychotherapy for Crisis - 60 (30-74*) min and 76254 - Psychotherapy for Crisis (Each additional 30 minutes) - 30 min        Noemi Pringle, LICSW, MSW, LICSW, Psychotherapist  [facility] - Triage & Transition Services  Callback: [phone]

## 2023-06-17 NOTE — ED PROVIDER NOTES
History     Chief Complaint   Patient presents with     Nausea & Vomiting     HPI  Audrey Snell is a 33 year old female who presents with nausea and vomiting.  This began this morning.  She has recurrent bouts with this.  She has significant anxiety that she endorses that she does feel as likely having a part in this.  No diarrhea.  No abdominal pain.  Last menstrual period was 11 days ago.  She will occasionally use marijuana for this but this seems to make things worse as well.  She has no medication otherwise for this.    In addition, she noticed a lump in her right upper arm and lump in her left breast.  Lump in right upper arm has been present for 8 months or greater, but seems to be expanding.  She just noticed the breast lump.    Allergies:  No Known Allergies    Problem List:    Patient Active Problem List    Diagnosis Date Noted     Iliotibial band syndrome, left knee 05/19/2016     Priority: Medium     Irritable bowel syndrome with diarrhea 03/03/2016     Priority: Medium     Migraine 06/22/2015     Priority: Medium     Problem list name updated by automated process. 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 No family hx of      Diabetes No family hx of      Cerebrovascular Disease No family hx of      Breast Cancer No family hx of      Prostate Cancer No family hx of      Alcohol/Drug No family hx of      Allergies No family hx of      Alzheimer Disease No family hx of      Anesthesia Reaction No family hx of      Arthritis No family hx of      Blood Disease No family hx of      Cardiovascular No family hx of      Circulatory No family hx of      Congenital Anomalies No family hx of      Connective Tissue Disorder No family hx of      Depression No family hx of      Endocrine Disease No family hx of      Eye Disorder No family hx of      Genetic Disorder No family hx of      Gastrointestinal Disease No family hx of      Genitourinary Problems No family hx of      Gynecology No family hx of      Heart Disease No family hx of      Lipids No family hx of      Musculoskeletal Disorder No family hx of      Neurologic Disorder No family hx of      Obesity No family hx of      Osteoporosis No family hx of      Psychotic Disorder No family hx of      Respiratory No family hx of      Thyroid Disease No family hx of      Hearing Loss No family hx of        Social History:  Marital Status:  Single [1]  Social History     Tobacco Use     Smoking status: Every Day     Packs/day: 1.00     Years: 3.50     Pack years: 3.50     Types: Cigarettes     Smokeless tobacco: Never     Tobacco comments:     1 ppd   Substance Use Topics     Alcohol use: Not Currently     Drug use: Yes     Comment: marijuana occasionally        Medications:    cetirizine (ZYRTEC) 10 MG tablet  LORazepam (ATIVAN) 1 MG tablet  Multiple Vitamins-Minerals (MULTIVITAMIN PO)          Review of Systems  All other systems are reviewed and are  "negative    Physical Exam   BP: (!) 130/92  Pulse: 81  Temp: 98  F (36.7  C)  Resp: 18  Height: 157.5 cm (5' 2\")  Weight: 73 kg (160 lb 14.4 oz)  SpO2: 99 %      Physical Exam  Vitals and nursing note reviewed.   Constitutional:       General: She is not in acute distress.     Appearance: She is well-developed. She is not diaphoretic.   HENT:      Head: Normocephalic and atraumatic.   Eyes:      General: No scleral icterus.     Pupils: Pupils are equal, round, and reactive to light.   Cardiovascular:      Rate and Rhythm: Normal rate and regular rhythm.      Heart sounds: Normal heart sounds. No murmur heard.  Pulmonary:      Effort: No respiratory distress.      Breath sounds: No stridor. No wheezing or rales.   Chest:      Comments: Lump in left lateral breast.  Mobile.  Abdominal:      Palpations: Abdomen is soft.      Tenderness: There is no abdominal tenderness.   Musculoskeletal:         General: No tenderness.      Cervical back: Normal range of motion and neck supple.      Comments: Soft tissue fullness over right inferior deltoid region.   Skin:     General: Skin is warm and dry.      Coloration: Skin is not pale.      Findings: No erythema or rash.   Neurological:      Mental Status: She is alert.   Psychiatric:         Mood and Affect: Mood is anxious. Affect is tearful.         Thought Content: Thought content does not include homicidal or suicidal ideation.         ED Course                 Procedures             Results for orders placed or performed during the hospital encounter of 06/17/23 (from the past 24 hour(s))   CBC with platelets differential    Narrative    The following orders were created for panel order CBC with platelets differential.  Procedure                               Abnormality         Status                     ---------                               -----------         ------                     CBC with platelets and d...[[id number]]                      Final result            "      Please view results for these tests on the individual orders.   Comprehensive metabolic panel   Result Value Ref Range    Sodium 137 136 - 145 mmol/L    Potassium 3.8 3.4 - 5.3 mmol/L    Chloride 104 98 - 107 mmol/L    Carbon Dioxide (CO2) 23 22 - 29 mmol/L    Anion Gap 10 7 - 15 mmol/L    Urea Nitrogen 8.2 6.0 - 20.0 mg/dL    Creatinine 0.62 0.51 - 0.95 mg/dL    Calcium 9.3 8.6 - 10.0 mg/dL    Glucose 105 (H) 70 - 99 mg/dL    Alkaline Phosphatase 54 35 - 104 U/L    AST 12 0 - 45 U/L    ALT 16 0 - 50 U/L    Protein Total 7.1 6.4 - 8.3 g/dL    Albumin 4.7 3.5 - 5.2 g/dL    Bilirubin Total 1.3 (H) <=1.2 mg/dL    GFR Estimate >90 >60 mL/min/1.73m2   CBC with platelets and differential   Result Value Ref Range    WBC Count 7.7 4.0 - 11.0 10e3/uL    RBC Count 4.71 3.80 - 5.20 10e6/uL    Hemoglobin 14.3 11.7 - 15.7 g/dL    Hematocrit 42.0 35.0 - 47.0 %    MCV 89 78 - 100 fL    MCH 30.4 26.5 - 33.0 pg    MCHC 34.0 31.5 - 36.5 g/dL    RDW 12.5 10.0 - 15.0 %    Platelet Count 299 150 - 450 10e3/uL    % Neutrophils 79 %    % Lymphocytes 15 %    % Monocytes 5 %    % Eosinophils 0 %    % Basophils 1 %    % Immature Granulocytes 0 %    NRBCs per 100 WBC 0 <1 /100    Absolute Neutrophils 6.2 1.6 - 8.3 10e3/uL    Absolute Lymphocytes 1.2 0.8 - 5.3 10e3/uL    Absolute Monocytes 0.4 0.0 - 1.3 10e3/uL    Absolute Eosinophils 0.0 0.0 - 0.7 10e3/uL    Absolute Basophils 0.0 0.0 - 0.2 10e3/uL    Absolute Immature Granulocytes 0.0 <=0.4 10e3/uL    Absolute NRBCs 0.0 10e3/uL   US Upper Extremity Non Vascular Right    Narrative    EXAM: ULTRASOUND UPPER EXTREMITY NON VASCULAR RIGHT  LOCATION: [facility]  DATE: 6/17/2023    INDICATION: Lump in upper arm.  COMPARISON: None.  TECHNIQUE: Sonographic evaluation of the area of concern.      Impression    IMPRESSION: In the area of concern, there is a relatively homogeneous slightly echogenic area of lobulated fullness subcutaneous fat compartment measuring  about 1.6 x 5.2 x 7.0 cm. Mild internal vascularity. This has a nonspecific sonographic appearance.   Differential considerations would include lipoma and low-grade liposarcoma. MRI could better evaluate.         Medications   ondansetron (ZOFRAN) injection 4 mg (4 mg Intravenous $Given 6/17/23 1346)   0.9% sodium chloride BOLUS (0 mLs Intravenous Stopped 6/17/23 1254)   ondansetron (ZOFRAN) injection 4 mg (4 mg Intravenous $Given 6/17/23 1202)   LORazepam (ATIVAN) injection 0.5 mg (0.5 mg Intravenous $Given 6/17/23 1206)   LORazepam (ATIVAN) injection 1 mg (1 mg Intravenous $Given 6/17/23 1346)       Assessments & Plan (with Medical Decision Making)  33-year-old female presented with significant anxiety, nausea and vomiting and noticed breast lump as well as right arm lump that has been present for at least 8 months.  Ultrasound of right upper arm shows this lump to be consistent with possible lipoma as described above.  She should follow-up with her primary care about this.  Breast lump, may need mammography.  Also should follow-up through primary care.  As far as anxiety disorder, she was given Ativan, fluids and Zofran here with improvement.  No suicidal or homicidal ideation.  She did do a consult with [facility].  They were able to set her up with an outpatient appointment in 3 days for medications long-term.  Prescribed Ativan in the short-term.  After 6 and half hours in the emergency department, stable and wanted to return home.       I have reviewed the nursing notes.    I have reviewed the findings, diagnosis, plan and need for follow up with the patient.          New Prescriptions    LORAZEPAM (ATIVAN) 1 MG TABLET    Take 1 tablet (1 mg) by mouth 2 times daily as needed for anxiety       Final diagnoses:   Anxiety   Nausea   Mass of left breast, unspecified quadrant   Skin lump of arm, right       6/17/2023   [facility] EMERGENCY DEPT     Jose Raul Powell MD  06/17/23 2043

## 2023-07-22 ENCOUNTER — HEALTH MAINTENANCE LETTER (OUTPATIENT)
Age: 33
End: 2023-07-22

## 2024-05-03 ENCOUNTER — OFFICE VISIT (OUTPATIENT)
Dept: BEHAVIORAL HEALTH | Facility: CLINIC | Age: 34
End: 2024-05-03

## 2024-05-03 ENCOUNTER — OFFICE VISIT (OUTPATIENT)
Dept: FAMILY MEDICINE | Facility: CLINIC | Age: 34
End: 2024-05-03

## 2024-05-03 VITALS
SYSTOLIC BLOOD PRESSURE: 130 MMHG | BODY MASS INDEX: 28.71 KG/M2 | DIASTOLIC BLOOD PRESSURE: 88 MMHG | HEIGHT: 62 IN | WEIGHT: 156 LBS | OXYGEN SATURATION: 97 % | RESPIRATION RATE: 20 BRPM | TEMPERATURE: 99.5 F | HEART RATE: 94 BPM

## 2024-05-03 DIAGNOSIS — F33.2 SEVERE EPISODE OF RECURRENT MAJOR DEPRESSIVE DISORDER, WITHOUT PSYCHOTIC FEATURES (H): Primary | ICD-10-CM

## 2024-05-03 DIAGNOSIS — R11.10 VOMITING, UNSPECIFIED VOMITING TYPE, UNSPECIFIED WHETHER NAUSEA PRESENT: ICD-10-CM

## 2024-05-03 DIAGNOSIS — F41.9 ANXIETY: ICD-10-CM

## 2024-05-03 DIAGNOSIS — Z03.89 NO DIAGNOSIS ON AXIS I: Primary | ICD-10-CM

## 2024-05-03 PROCEDURE — 84443 ASSAY THYROID STIM HORMONE: CPT | Performed by: PHYSICIAN ASSISTANT

## 2024-05-03 PROCEDURE — 36415 COLL VENOUS BLD VENIPUNCTURE: CPT | Performed by: PHYSICIAN ASSISTANT

## 2024-05-03 PROCEDURE — 82306 VITAMIN D 25 HYDROXY: CPT | Performed by: PHYSICIAN ASSISTANT

## 2024-05-03 PROCEDURE — 99214 OFFICE O/P EST MOD 30 MIN: CPT | Performed by: PHYSICIAN ASSISTANT

## 2024-05-03 PROCEDURE — 80048 BASIC METABOLIC PNL TOTAL CA: CPT | Performed by: PHYSICIAN ASSISTANT

## 2024-05-03 PROCEDURE — 99207 PR NO CHARGE LOS: CPT

## 2024-05-03 RX ORDER — HYDROXYZINE HYDROCHLORIDE 25 MG/1
25 TABLET, FILM COATED ORAL 3 TIMES DAILY PRN
Qty: 60 TABLET | Refills: 0 | Status: SHIPPED | OUTPATIENT
Start: 2024-05-03

## 2024-05-03 RX ORDER — FLUOXETINE 10 MG/1
10 CAPSULE ORAL DAILY
Qty: 30 CAPSULE | Refills: 0 | Status: SHIPPED | OUTPATIENT
Start: 2024-05-03 | End: 2024-06-02

## 2024-05-03 ASSESSMENT — PATIENT HEALTH QUESTIONNAIRE - PHQ9
10. IF YOU CHECKED OFF ANY PROBLEMS, HOW DIFFICULT HAVE THESE PROBLEMS MADE IT FOR YOU TO DO YOUR WORK, TAKE CARE OF THINGS AT HOME, OR GET ALONG WITH OTHER PEOPLE: EXTREMELY DIFFICULT
SUM OF ALL RESPONSES TO PHQ QUESTIONS 1-9: 25
SUM OF ALL RESPONSES TO PHQ QUESTIONS 1-9: 25

## 2024-05-03 ASSESSMENT — ENCOUNTER SYMPTOMS: NERVOUS/ANXIOUS: 1

## 2024-05-03 ASSESSMENT — PAIN SCALES - GENERAL: PAINLEVEL: NO PAIN (1)

## 2024-05-03 NOTE — PROGRESS NOTES
Assessment & Plan     (F33.2) Severe episode of recurrent major depressive disorder, without psychotic features (H)  (primary encounter diagnosis)  Comment: Patient with ongoing depression.  Has been having depressive symptoms for months to years.  This has been exacerbation lately with the passing of a family pet.  She describes passive thoughts of self-harm without any suicidal ideation.  Patient states she would never act on these thoughts.  Did have the patient speak with therapist to come to clinic here today.  Will start the patient on Prozac 10 mg as well as hydroxyzine 25 mg.  Discussed side effects of medications.  Patient will monitor for any worsening thoughts of self-harm and seek emergency department evaluation should this develop.  Follow-up in 1 month for recheck.  Plan: FLUoxetine (PROZAC) 10 MG capsule, TSH with         free T4 reflex, Vitamin D Deficiency, Adult         Mental Health  Referral          (F41.9) Anxiety  Comment: Ongoing anxiety.  Patient with years of anxiety symptoms with near daily vomiting.  Discussed using hydroxyzine as well as screening metabolic panel.  No abdominal pain on examination.  Denies any suicidal plan.  Discussed with patient if she has worsening thoughts to seek emergent evaluation.  Plan: hydrOXYzine HCl (ATARAX) 25 MG tablet,         FLUoxetine (PROZAC) 10 MG capsule, TSH with         free T4 reflex, Adult Mental Health          Referral          (R11.10) Vomiting, unspecified vomiting type, unspecified whether nausea present  Comment: No reproducible abdominal pain.  Discussed with patient screening labs.  Plan: Basic metabolic panel  (Ca, Cl, CO2, Creat,         Gluc, K, Na, BUN)          Depression Screening Follow Up    Follow Up Actions Taken  Mental Health Referral placed  Medication order placed.  Reviewed medications with patient.      Minerva Ventura is a 33 year old, presenting for the following health issues:  Anxiety (Per pt Hx  "of anxiety , episodes of vomiting with anxiety , every morning is throwing up due to the anxiety. Pt mentions she has agoraphobia, which is preventing her from going to work. Panic attacks, that are draining her. ) and Depression        5/3/2024    11:44 AM   Additional Questions   Roomed by Carly   Accompanied by self         5/3/2024    11:44 AM   Patient Reported Additional Medications   Patient reports taking the following new medications n/a     Anxiety    History of Present Illness       Reason for visit:  Severe anxiety/depression    She eats 0-1 servings of fruits and vegetables daily.She consumes 1 sweetened beverage(s) daily.She exercises with enough effort to increase her heart rate 60 or more minutes per day.  She exercises with enough effort to increase her heart rate 5 days per week.   She is taking medications regularly.     Increased anxiety and depression issues. 2014 developed nausea and vomiting issues after moving out on own. At that time diagnosed with cannabinoid hyperemesis syndrome. Then would have waxing and waning issues over the years. Stopped all cannabis. Has been feeling more and more anxious. Does vomit each morning. Has excessive worry with this. Has anxiety with thoughts of upcoming plans. Has been having ongoing OCD and stress surrounding routine issues. If her routine is off then the rest of the day is off.     Last month had put dog down after which increased difficulties with sleeping, eating, lack of motivation to do anything. Has been waking frequently. No issues falling asleep. When wakes up has excessive worrying. More panic episodes.     Wellbutrin did not like the way she felt  sertraline more tearful   Brother on setraline   Father on mood stabilizer and alprazolam   Sister on mood stabilizer and alprazolam     Patient denies any suicidal ideation.  She states over the last month she did get down to her lowest with the thought of \"I do not want to be here anymore.\".  " Patient states that she would never act on these thoughts.  Does not have a plan in place.  Describes more passive thoughts that she does not want to be here.  she does smoke cigarettes.  Denies any alcohol or illicit substance abuse.  Patient is a .  Typically goes to bed around 2 AM each night.      PATIENT HEALTH QUESTIONNAIRE-9 (PHQ - 9)    Over the last 2 weeks, how often have you been bothered by any of the following problems?    1. Little interest or pleasure in doing things -  Nearly every day   2. Feeling down, depressed, or hopeless -  Nearly every day   3. Trouble falling or staying asleep, or sleeping too much - Nearly every day   4. Feeling tired or having little energy -  Nearly every day   5. Poor appetite or overeating -  Nearly every day   6. Feeling bad about yourself - or that you are a failure or have let yourself or your family down -  Nearly every day   7. Trouble concentrating on things, such as reading the newspaper or watching television - Nearly every day   8. Moving or speaking so slowly that other people could have noticed? Or the opposite - being so fidgety or restless that you have been moving around a lot more than usual Nearly every day   9. Thoughts that you would be better off dead or of hurting  yourself in some way Several days   Total Score: 25     If you checked off any problems, how difficult have these problems made it for you to do your work, take care of things at home, or get along with other people?      Developed by Dean Cruz, Rabia Padilla, Aayush Matt and colleagues, with an educational mike from [employer] No permission required to reproduce, translate, display or distribute. permission required to reproduce, translate, display or distribute.            Review of Systems  Constitutional, neuro, ENT, endocrine, pulmonary, cardiac, gastrointestinal, genitourinary, musculoskeletal, integument and psychiatric systems are negative, except as  "otherwise noted.      Objective    /88   Pulse 94   Temp 99.5  F (37.5  C) (Tympanic)   Resp 20   Ht 1.575 m (5' 2\")   Wt 70.8 kg (156 lb)   LMP 04/08/2024 (Exact Date)   SpO2 97%   BMI 28.53 kg/m    Body mass index is 28.53 kg/m .  Physical Exam   GENERAL: alert  RESP: lungs clear to auscultation - no rales, rhonchi or wheezes  CV: regular rate and rhythm, normal S1 S2, no S3 or S4, no murmur, click or rub, no peripheral edema  ABDOMEN: soft, nontender, no hepatosplenomegaly, no masses and bowel sounds normal  MS: no gross musculoskeletal defects noted, no edema  PSYCH: mentation appears normal, tearful, anxious, and not appearing  to respond to internal stimuli.  Denies any hallucinations or delusions.            Signed Electronically by: Miki Pineda PA-C    "

## 2024-05-03 NOTE — PROGRESS NOTES
Warm-handoff     Patient had appointment with his/her primary care physician. [facility] services were requested. No immediate concerns for risk or safety identified. Explained the role of the [facility] and ways to schedule if interested. Writer went through relaxation strategies, psycho-education on sx mgmt and MH medication. PCP to prescribe Prozac and Hydroxyzine. Writer gave pt psycho-education on relaxation strategies and panic attacks. Pt interested in a [facility] next week virtually to work on anxiety/panic attacks. Pt denied concerns for well being/safety.     SREEDHAR Gomez, [facility]  Behavioral Health Clinician  [facility]  [address] [place] [address]  [phone]

## 2024-05-04 LAB
ANION GAP SERPL CALCULATED.3IONS-SCNC: 8 MMOL/L (ref 7–15)
BUN SERPL-MCNC: 7.3 MG/DL (ref 6–20)
CALCIUM SERPL-MCNC: 9 MG/DL (ref 8.6–10)
CHLORIDE SERPL-SCNC: 105 MMOL/L (ref 98–107)
CREAT SERPL-MCNC: 0.71 MG/DL (ref 0.51–0.95)
DEPRECATED HCO3 PLAS-SCNC: 23 MMOL/L (ref 22–29)
EGFRCR SERPLBLD CKD-EPI 2021: >90 ML/MIN/1.73M2
GLUCOSE SERPL-MCNC: 93 MG/DL (ref 70–99)
POTASSIUM SERPL-SCNC: 4.4 MMOL/L (ref 3.4–5.3)
SODIUM SERPL-SCNC: 136 MMOL/L (ref 135–145)
TSH SERPL DL<=0.005 MIU/L-ACNC: 2.18 UIU/ML (ref 0.3–4.2)
VIT D+METAB SERPL-MCNC: 51 NG/ML (ref 20–50)

## 2024-05-05 ENCOUNTER — MYC MEDICAL ADVICE (OUTPATIENT)
Dept: FAMILY MEDICINE | Facility: CLINIC | Age: 34
End: 2024-05-05

## 2024-05-05 DIAGNOSIS — R11.10 VOMITING, UNSPECIFIED VOMITING TYPE, UNSPECIFIED WHETHER NAUSEA PRESENT: Primary | ICD-10-CM

## 2024-05-06 RX ORDER — ONDANSETRON 4 MG/1
4 TABLET, ORALLY DISINTEGRATING ORAL EVERY 8 HOURS PRN
Qty: 5 TABLET | Refills: 0 | Status: SHIPPED | OUTPATIENT
Start: 2024-05-06

## 2024-09-08 ENCOUNTER — HEALTH MAINTENANCE LETTER (OUTPATIENT)
Age: 34
End: 2024-09-08

## 2025-03-31 ENCOUNTER — TELEPHONE (OUTPATIENT)
Dept: FAMILY MEDICINE | Facility: OTHER | Age: 35
End: 2025-03-31

## 2025-03-31 NOTE — TELEPHONE ENCOUNTER
Patient Quality Outreach    Patient is due for the following:   Cervical Cancer Screening - PAP Needed  Depression  -  PHQ-9 needed  Physical Preventive Adult Physical      Topic Date Due    Pneumococcal Vaccine (1 of 2 - PCV) Never done    Hepatitis B Vaccine (1 of 3 - 19+ 3-dose series) Never done    Diptheria Tetanus Pertussis (DTAP/TDAP/TD) Vaccine (2 - Td or Tdap) 08/22/2023    Flu Vaccine (1) 09/01/2024    COVID-19 Vaccine (3 - 2024-25 season) 09/01/2024       Action(s) Taken:   Schedule a Adult Preventative    Type of outreach:    Sent [employer] message.    Questions for provider review:             Jak Copeland MA  Chart routed to .